# Patient Record
Sex: MALE | Race: OTHER | URBAN - METROPOLITAN AREA
[De-identification: names, ages, dates, MRNs, and addresses within clinical notes are randomized per-mention and may not be internally consistent; named-entity substitution may affect disease eponyms.]

---

## 2020-06-09 ENCOUNTER — OFFICE VISIT (OUTPATIENT)
Dept: FAMILY MEDICINE CLINIC | Facility: CLINIC | Age: 40
End: 2020-06-09
Payer: COMMERCIAL

## 2020-06-09 VITALS
HEART RATE: 77 BPM | RESPIRATION RATE: 18 BRPM | SYSTOLIC BLOOD PRESSURE: 120 MMHG | BODY MASS INDEX: 44.1 KG/M2 | DIASTOLIC BLOOD PRESSURE: 90 MMHG | WEIGHT: 315 LBS | HEIGHT: 71 IN

## 2020-06-09 DIAGNOSIS — E66.01 MORBID OBESITY WITH BMI OF 50.0-59.9, ADULT (HCC): ICD-10-CM

## 2020-06-09 DIAGNOSIS — M25.561 CHRONIC PAIN OF BOTH KNEES: ICD-10-CM

## 2020-06-09 DIAGNOSIS — Z00.00 ENCOUNTER FOR PREVENTIVE CARE: ICD-10-CM

## 2020-06-09 DIAGNOSIS — M25.562 CHRONIC PAIN OF BOTH KNEES: ICD-10-CM

## 2020-06-09 DIAGNOSIS — Z76.89 ESTABLISHING CARE WITH NEW DOCTOR, ENCOUNTER FOR: Primary | ICD-10-CM

## 2020-06-09 DIAGNOSIS — G89.29 CHRONIC PAIN OF BOTH KNEES: ICD-10-CM

## 2020-06-09 PROCEDURE — 99385 PREV VISIT NEW AGE 18-39: CPT | Performed by: FAMILY MEDICINE

## 2020-06-09 NOTE — PROGRESS NOTES
HPI:    Patient ID: Brad Quach is a 46year old male.     This is a 29-year-old [address] [facility] male here to establish care with new physician and also for preventive care and for status update on any confirmed chronic medical illnesses and follow up crepitus bilateral knees. Neurological: He is alert and oriented to person, place, and time. ASSESSMENT/PLAN:   1. Establishing care with new doctor, encounter for  The following have been ordered.   - CBC WITH DIFFERENTIAL WITH PLATELET; Fut

## 2020-06-09 NOTE — PATIENT INSTRUCTIONS
All adult screening ordered and done appropriate for patient's age and gender and risk factors and complaints. Recommend weight loss via daily exercising and consistent healthy dietary changes.   Encouraged physical fitness and daily physical activity oliver

## 2020-07-17 ENCOUNTER — NURSE ONLY (OUTPATIENT)
Dept: FAMILY MEDICINE CLINIC | Facility: CLINIC | Age: 40
End: 2020-07-17
Payer: COMMERCIAL

## 2020-07-17 DIAGNOSIS — Z76.89 ESTABLISHING CARE WITH NEW DOCTOR, ENCOUNTER FOR: ICD-10-CM

## 2020-07-17 LAB
ALBUMIN SERPL-MCNC: 3.8 G/DL (ref 3.4–5)
ALBUMIN/GLOB SERPL: 0.8 {RATIO} (ref 1–2)
ALP LIVER SERPL-CCNC: 74 U/L (ref 45–117)
ALT SERPL-CCNC: 68 U/L (ref 16–61)
ANION GAP SERPL CALC-SCNC: 10 MMOL/L (ref 0–18)
AST SERPL-CCNC: 46 U/L (ref 15–37)
BASOPHILS # BLD AUTO: 0.11 X10(3) UL (ref 0–0.2)
BASOPHILS NFR BLD AUTO: 1.3 %
BILIRUB SERPL-MCNC: 0.4 MG/DL (ref 0.1–2)
BILIRUB UR QL: NEGATIVE
BUN BLD-MCNC: 15 MG/DL (ref 7–18)
BUN/CREAT SERPL: 16.3 (ref 10–20)
CALCIUM BLD-MCNC: 9.7 MG/DL (ref 8.5–10.1)
CHLORIDE SERPL-SCNC: 105 MMOL/L (ref 98–112)
CHOLEST SMN-MCNC: 239 MG/DL (ref ?–200)
CLARITY UR: CLEAR
CO2 SERPL-SCNC: 27 MMOL/L (ref 21–32)
COLOR UR: YELLOW
CREAT BLD-MCNC: 0.92 MG/DL (ref 0.7–1.3)
DEPRECATED RDW RBC AUTO: 40.8 FL (ref 35.1–46.3)
EOSINOPHIL # BLD AUTO: 0.13 X10(3) UL (ref 0–0.7)
EOSINOPHIL NFR BLD AUTO: 1.5 %
ERYTHROCYTE [DISTWIDTH] IN BLOOD BY AUTOMATED COUNT: 13.2 % (ref 11–15)
GLOBULIN PLAS-MCNC: 4.5 G/DL (ref 2.8–4.4)
GLUCOSE BLD-MCNC: 100 MG/DL (ref 70–99)
GLUCOSE UR-MCNC: NEGATIVE MG/DL
HCT VFR BLD AUTO: 47.3 % (ref 39–53)
HDLC SERPL-MCNC: 39 MG/DL (ref 40–59)
HGB BLD-MCNC: 15.4 G/DL (ref 13–17.5)
HGB UR QL STRIP.AUTO: NEGATIVE
IMM GRANULOCYTES # BLD AUTO: 0.02 X10(3) UL (ref 0–1)
IMM GRANULOCYTES NFR BLD: 0.2 %
KETONES UR-MCNC: NEGATIVE MG/DL
LDLC SERPL CALC-MCNC: 178 MG/DL (ref ?–100)
LEUKOCYTE ESTERASE UR QL STRIP.AUTO: NEGATIVE
LYMPHOCYTES # BLD AUTO: 3.18 X10(3) UL (ref 1–4)
LYMPHOCYTES NFR BLD AUTO: 37.1 %
M PROTEIN MFR SERPL ELPH: 8.3 G/DL (ref 6.4–8.2)
MCH RBC QN AUTO: 27.7 PG (ref 26–34)
MCHC RBC AUTO-ENTMCNC: 32.6 G/DL (ref 31–37)
MCV RBC AUTO: 85.2 FL (ref 80–100)
MONOCYTES # BLD AUTO: 0.56 X10(3) UL (ref 0.1–1)
MONOCYTES NFR BLD AUTO: 6.5 %
NEUTROPHILS # BLD AUTO: 4.56 X10 (3) UL (ref 1.5–7.7)
NEUTROPHILS # BLD AUTO: 4.56 X10(3) UL (ref 1.5–7.7)
NEUTROPHILS NFR BLD AUTO: 53.4 %
NITRITE UR QL STRIP.AUTO: NEGATIVE
NONHDLC SERPL-MCNC: 200 MG/DL (ref ?–130)
OSMOLALITY SERPL CALC.SUM OF ELEC: 295 MOSM/KG (ref 275–295)
PATIENT FASTING Y/N/NP: YES
PATIENT FASTING Y/N/NP: YES
PH UR: 5 [PH] (ref 5–8)
PLATELET # BLD AUTO: 289 10(3)UL (ref 150–450)
POTASSIUM SERPL-SCNC: 4.3 MMOL/L (ref 3.5–5.1)
PROT UR-MCNC: NEGATIVE MG/DL
RBC # BLD AUTO: 5.55 X10(6)UL (ref 4.3–5.7)
SODIUM SERPL-SCNC: 142 MMOL/L (ref 136–145)
SP GR UR STRIP: 1.02 (ref 1–1.03)
TRIGL SERPL-MCNC: 111 MG/DL (ref 30–149)
TSI SER-ACNC: 3.17 MIU/ML (ref 0.36–3.74)
UROBILINOGEN UR STRIP-ACNC: <2
VLDLC SERPL CALC-MCNC: 22 MG/DL (ref 0–30)
WBC # BLD AUTO: 8.6 X10(3) UL (ref 4–11)

## 2022-06-30 ENCOUNTER — LAB ENCOUNTER (OUTPATIENT)
Dept: LAB | Age: 42
End: 2022-06-30
Attending: FAMILY MEDICINE
Payer: COMMERCIAL

## 2022-06-30 ENCOUNTER — OFFICE VISIT (OUTPATIENT)
Dept: FAMILY MEDICINE CLINIC | Facility: CLINIC | Age: 42
End: 2022-06-30
Payer: COMMERCIAL

## 2022-06-30 VITALS
DIASTOLIC BLOOD PRESSURE: 90 MMHG | OXYGEN SATURATION: 99 % | SYSTOLIC BLOOD PRESSURE: 130 MMHG | HEART RATE: 78 BPM | BODY MASS INDEX: 44.1 KG/M2 | RESPIRATION RATE: 19 BRPM | HEIGHT: 71 IN | WEIGHT: 315 LBS

## 2022-06-30 DIAGNOSIS — E55.9 VITAMIN D DEFICIENCY: ICD-10-CM

## 2022-06-30 DIAGNOSIS — Z00.00 ROUTINE PHYSICAL EXAMINATION: Primary | ICD-10-CM

## 2022-06-30 DIAGNOSIS — R68.82 DECREASED LIBIDO: ICD-10-CM

## 2022-06-30 DIAGNOSIS — Z00.00 ROUTINE PHYSICAL EXAMINATION: ICD-10-CM

## 2022-06-30 LAB
ALBUMIN SERPL-MCNC: 3.5 G/DL (ref 3.4–5)
ALBUMIN/GLOB SERPL: 0.7 {RATIO} (ref 1–2)
ALP LIVER SERPL-CCNC: 74 U/L
ALT SERPL-CCNC: 73 U/L
ANION GAP SERPL CALC-SCNC: 7 MMOL/L (ref 0–18)
AST SERPL-CCNC: 55 U/L (ref 15–37)
BASOPHILS # BLD AUTO: 0.13 X10(3) UL (ref 0–0.2)
BASOPHILS NFR BLD AUTO: 1.3 %
BILIRUB SERPL-MCNC: 0.6 MG/DL (ref 0.1–2)
BILIRUB UR QL: NEGATIVE
BUN BLD-MCNC: 9 MG/DL (ref 7–18)
BUN/CREAT SERPL: 11.1 (ref 10–20)
CALCIUM BLD-MCNC: 9.3 MG/DL (ref 8.5–10.1)
CHLORIDE SERPL-SCNC: 106 MMOL/L (ref 98–112)
CHOLEST SERPL-MCNC: 223 MG/DL (ref ?–200)
CLARITY UR: CLEAR
CO2 SERPL-SCNC: 29 MMOL/L (ref 21–32)
COLOR UR: YELLOW
COMPLEXED PSA SERPL-MCNC: 0.47 NG/ML (ref ?–4)
CREAT BLD-MCNC: 0.81 MG/DL
DEPRECATED RDW RBC AUTO: 42.5 FL (ref 35.1–46.3)
EOSINOPHIL # BLD AUTO: 0.28 X10(3) UL (ref 0–0.7)
EOSINOPHIL NFR BLD AUTO: 2.8 %
ERYTHROCYTE [DISTWIDTH] IN BLOOD BY AUTOMATED COUNT: 13.4 % (ref 11–15)
FASTING PATIENT LIPID ANSWER: YES
FASTING STATUS PATIENT QL REPORTED: YES
GLOBULIN PLAS-MCNC: 4.7 G/DL (ref 2.8–4.4)
GLUCOSE BLD-MCNC: 99 MG/DL (ref 70–99)
GLUCOSE UR-MCNC: NEGATIVE MG/DL
HCT VFR BLD AUTO: 48.5 %
HDLC SERPL-MCNC: 41 MG/DL (ref 40–59)
HGB BLD-MCNC: 15.5 G/DL
HGB UR QL STRIP.AUTO: NEGATIVE
IMM GRANULOCYTES # BLD AUTO: 0.06 X10(3) UL (ref 0–1)
IMM GRANULOCYTES NFR BLD: 0.6 %
KETONES UR-MCNC: NEGATIVE MG/DL
LDLC SERPL CALC-MCNC: 163 MG/DL (ref ?–100)
LEUKOCYTE ESTERASE UR QL STRIP.AUTO: NEGATIVE
LYMPHOCYTES # BLD AUTO: 3.39 X10(3) UL (ref 1–4)
LYMPHOCYTES NFR BLD AUTO: 33.7 %
MCH RBC QN AUTO: 27.7 PG (ref 26–34)
MCHC RBC AUTO-ENTMCNC: 32 G/DL (ref 31–37)
MCV RBC AUTO: 86.8 FL
MONOCYTES # BLD AUTO: 0.57 X10(3) UL (ref 0.1–1)
MONOCYTES NFR BLD AUTO: 5.7 %
NEUTROPHILS # BLD AUTO: 5.62 X10 (3) UL (ref 1.5–7.7)
NEUTROPHILS # BLD AUTO: 5.62 X10(3) UL (ref 1.5–7.7)
NEUTROPHILS NFR BLD AUTO: 55.9 %
NITRITE UR QL STRIP.AUTO: NEGATIVE
NONHDLC SERPL-MCNC: 182 MG/DL (ref ?–130)
OSMOLALITY SERPL CALC.SUM OF ELEC: 293 MOSM/KG (ref 275–295)
PH UR: 5 [PH] (ref 5–8)
PLATELET # BLD AUTO: 291 10(3)UL (ref 150–450)
POTASSIUM SERPL-SCNC: 4.2 MMOL/L (ref 3.5–5.1)
PROT SERPL-MCNC: 8.2 G/DL (ref 6.4–8.2)
PROT UR-MCNC: NEGATIVE MG/DL
RBC # BLD AUTO: 5.59 X10(6)UL
SODIUM SERPL-SCNC: 142 MMOL/L (ref 136–145)
SP GR UR STRIP: 1.02 (ref 1–1.03)
TRIGL SERPL-MCNC: 105 MG/DL (ref 30–149)
TSI SER-ACNC: 1.79 MIU/ML (ref 0.36–3.74)
UROBILINOGEN UR STRIP-ACNC: 0.2
VIT D+METAB SERPL-MCNC: 11.7 NG/ML (ref 30–100)
VLDLC SERPL CALC-MCNC: 21 MG/DL (ref 0–30)
WBC # BLD AUTO: 10.1 X10(3) UL (ref 4–11)

## 2022-06-30 PROCEDURE — 99212 OFFICE O/P EST SF 10 MIN: CPT | Performed by: FAMILY MEDICINE

## 2022-06-30 PROCEDURE — 3080F DIAST BP >= 90 MM HG: CPT | Performed by: FAMILY MEDICINE

## 2022-06-30 PROCEDURE — 84403 ASSAY OF TOTAL TESTOSTERONE: CPT

## 2022-06-30 PROCEDURE — 99396 PREV VISIT EST AGE 40-64: CPT | Performed by: FAMILY MEDICINE

## 2022-06-30 PROCEDURE — 3075F SYST BP GE 130 - 139MM HG: CPT | Performed by: FAMILY MEDICINE

## 2022-06-30 PROCEDURE — 90471 IMMUNIZATION ADMIN: CPT | Performed by: FAMILY MEDICINE

## 2022-06-30 PROCEDURE — 80061 LIPID PANEL: CPT

## 2022-06-30 PROCEDURE — 84402 ASSAY OF FREE TESTOSTERONE: CPT

## 2022-06-30 PROCEDURE — 90715 TDAP VACCINE 7 YRS/> IM: CPT | Performed by: FAMILY MEDICINE

## 2022-06-30 PROCEDURE — 36415 COLL VENOUS BLD VENIPUNCTURE: CPT

## 2022-06-30 PROCEDURE — 84443 ASSAY THYROID STIM HORMONE: CPT

## 2022-06-30 PROCEDURE — 80053 COMPREHEN METABOLIC PANEL: CPT

## 2022-06-30 PROCEDURE — 3008F BODY MASS INDEX DOCD: CPT | Performed by: FAMILY MEDICINE

## 2022-06-30 PROCEDURE — 82306 VITAMIN D 25 HYDROXY: CPT

## 2022-06-30 PROCEDURE — 85025 COMPLETE CBC W/AUTO DIFF WBC: CPT

## 2022-06-30 PROCEDURE — 81003 URINALYSIS AUTO W/O SCOPE: CPT

## 2022-06-30 NOTE — PROGRESS NOTES
Subjective:     Patient ID: Elsy San Luis is a 37year old male. 37year old obese male here for complete preventive care physical and for status update on any confirmed chronic medical illnesses and follow up on any previous labs or procedures that were suggestive or in need of further work up. Bowel, bladder, and sexual function are intact. All physical or mental efforts followed by unreasonable fatigue over the last 5 months or so. Sexual performance also declining. Tried some commercial ED enhancement which did not work. Fogginess experienced while working and while at work on [employer]. Occasional snoring and no apneic spells noted. History/Other:   Review of Systems  No current outpatient medications on file. Allergies:Not on File    History reviewed. No pertinent past medical history. History reviewed. No pertinent surgical history. History reviewed. No pertinent family history. Social History: Social History    Tobacco Use      Smoking status: Current Some Day Smoker        Types: Cigars, Cigarettes      Smokeless tobacco: Never Used    Vaping Use      Vaping Use: Never used    Alcohol use: Yes    Drug use: Never       Objective:     06/30/22  1542   BP: 130/90   Pulse:    Resp:        Physical Exam  Constitutional:       Appearance: Normal appearance. He is obese. HENT:      Head: Normocephalic and atraumatic. Right Ear: Tympanic membrane normal.      Left Ear: Tympanic membrane normal.      Nose: Nose normal.      Mouth/Throat:      Mouth: Mucous membranes are moist.   Cardiovascular:      Rate and Rhythm: Normal rate and regular rhythm. Heart sounds: Normal heart sounds. No friction rub. Pulmonary:      Effort: Pulmonary effort is normal. No respiratory distress. Breath sounds: Normal breath sounds. Neurological:      General: No focal deficit present. Mental Status: He is alert and oriented to person, place, and time.    Psychiatric:         Mood and Affect: Mood normal.         Behavior: Behavior normal.         Thought Content: Thought content normal.         Judgment: Judgment normal.         Assessment & Plan:   1. Routine physical examination  The following have been ordered. - CBC WITH DIFFERENTIAL WITH PLATELET; Future  - COMP METABOLIC PANEL (14); Future  - LIPID PANEL; Future  - URINALYSIS, ROUTINE; Future  - TSH W REFLEX TO FREE T4; Future  - PSA SCREEN; Future    2. Decreased libido  Libido work-up initiated. - TESTOSTERONE,FREE AND TOTAL; Future    3. Vitamin D deficiency  Vitamin D level assessment.  - VITAMIN D; Future    Orders Placed This Encounter      CBC With Differential With Platelet      Comp Metabolic Panel (14)      Lipid Panel      Urinalysis, Routine      TSH W Reflex To Free T4      PSA Screen      TETANUS, DIPHTHERIA TOXOIDS AND ACELLULAR PERTUSIS VACCINE (TDAP), >7 YEARS, IM USE      Meds This Visit:  Requested Prescriptions      No prescriptions requested or ordered in this encounter       Imaging & Referrals:  TETANUS, DIPHTHERIA TOXOIDS AND ACELLULAR PERTUSIS VACCINE (TDAP), >7 YEARS, IM USE   Patient Instructions   All adult screening ordered and done appropriate for patient's age and gender and risk factors and complaints. In addition to the preventive care labs, we will get testosterone level free and total as well as a vitamin D3 level. If all labs are normal and unremarkable we will give consideration to urology consultation to see if urinary frequency and diminished sexual function are secondary to benign prostate enlargement.

## 2022-06-30 NOTE — PATIENT INSTRUCTIONS
All adult screening ordered and done appropriate for patient's age and gender and risk factors and complaints. In addition to the preventive care labs, we will get testosterone level free and total as well as a vitamin D3 level. If all labs are normal and unremarkable we will give consideration to urology consultation to see if urinary frequency and diminished sexual function are secondary to benign prostate enlargement.

## 2022-07-10 DIAGNOSIS — E55.9 VITAMIN D DEFICIENCY: Primary | ICD-10-CM

## 2022-07-10 RX ORDER — ERGOCALCIFEROL 1.25 MG/1
50000 CAPSULE ORAL WEEKLY
Qty: 12 CAPSULE | Refills: 0 | Status: SHIPPED | OUTPATIENT
Start: 2022-07-10 | End: 2022-10-02

## 2022-08-02 LAB
TESTOSTERONE, FREE, S: 4.27 NG/DL
TESTOSTERONE, TOTAL, S: 201 NG/DL

## 2022-08-04 DIAGNOSIS — E55.9 VITAMIN D DEFICIENCY: Primary | ICD-10-CM

## 2022-08-04 RX ORDER — ERGOCALCIFEROL 1.25 MG/1
50000 CAPSULE ORAL WEEKLY
Qty: 12 CAPSULE | Refills: 0 | Status: SHIPPED | OUTPATIENT
Start: 2022-08-04 | End: 2022-10-27

## 2023-12-19 ENCOUNTER — OFFICE VISIT (OUTPATIENT)
Dept: FAMILY MEDICINE CLINIC | Facility: CLINIC | Age: 43
End: 2023-12-19
Payer: COMMERCIAL

## 2023-12-19 VITALS
HEIGHT: 71 IN | BODY MASS INDEX: 44.1 KG/M2 | OXYGEN SATURATION: 97 % | DIASTOLIC BLOOD PRESSURE: 86 MMHG | TEMPERATURE: 98 F | WEIGHT: 315 LBS | SYSTOLIC BLOOD PRESSURE: 132 MMHG | HEART RATE: 86 BPM

## 2023-12-19 DIAGNOSIS — E66.01 MORBID OBESITY WITH BMI OF 60.0-69.9, ADULT (HCC): ICD-10-CM

## 2023-12-19 DIAGNOSIS — R06.83 SNORING: ICD-10-CM

## 2023-12-19 DIAGNOSIS — R53.83 MALAISE AND FATIGUE: ICD-10-CM

## 2023-12-19 DIAGNOSIS — M25.521 CHRONIC PAIN OF RIGHT ELBOW: Primary | ICD-10-CM

## 2023-12-19 DIAGNOSIS — G89.29 CHRONIC PAIN OF RIGHT ELBOW: Primary | ICD-10-CM

## 2023-12-19 DIAGNOSIS — R29.818 SUSPECTED SLEEP APNEA: ICD-10-CM

## 2023-12-19 DIAGNOSIS — Z00.00 ROUTINE PHYSICAL EXAMINATION: ICD-10-CM

## 2023-12-19 DIAGNOSIS — N52.9 ERECTILE DYSFUNCTION, UNSPECIFIED ERECTILE DYSFUNCTION TYPE: ICD-10-CM

## 2023-12-19 DIAGNOSIS — R53.81 MALAISE AND FATIGUE: ICD-10-CM

## 2023-12-19 PROCEDURE — 99213 OFFICE O/P EST LOW 20 MIN: CPT | Performed by: FAMILY MEDICINE

## 2023-12-19 PROCEDURE — 3079F DIAST BP 80-89 MM HG: CPT | Performed by: FAMILY MEDICINE

## 2023-12-19 PROCEDURE — 99396 PREV VISIT EST AGE 40-64: CPT | Performed by: FAMILY MEDICINE

## 2023-12-19 PROCEDURE — 3008F BODY MASS INDEX DOCD: CPT | Performed by: FAMILY MEDICINE

## 2023-12-19 PROCEDURE — 3075F SYST BP GE 130 - 139MM HG: CPT | Performed by: FAMILY MEDICINE

## 2023-12-19 NOTE — PROGRESS NOTES
Subjective:     Patient ID: Samira Ruiz is a 44year old male. This patient has a 50-year-old morbidly obese gentleman here for complete preventive care physical and for status update on any confirmed chronic medical illnesses and follow up on any previous labs or procedures that were suggestive or in need of further work up. Bowel and bladder functions are intact. The patient's greater concern is that he is not reasonably fatigued and can fall asleep easily sitting in the chair upright without any stimulation. Patient does snore. Patient seeking bariatric referral for weight reduction. Patient states that his lack of energy is worsening. There was no increased energy and spite of testosterone replacement. Patient did not improve sexual function on ED medications or HYMS product. Patient also reports pain over the last 2 months without any direct trauma or provocation on the right lateral right elbow. The patient does golf. History/Other:   Review of Systems  Current Outpatient Medications   Medication Sig Dispense Refill    Testosterone (ANDROGEL) 40.5 MG/2.5GM (1.62%) Transdermal Gel Place 1 Application onto the skin daily. (Patient not taking: Reported on 12/19/2023) 90 each 0     Allergies:No Known Allergies    No past medical history on file. No past surgical history on file. No family history on file. Social History:   Social History     Socioeconomic History    Marital status:    Tobacco Use    Smoking status: Some Days     Types: Cigars, Cigarettes    Smokeless tobacco: Never   Vaping Use    Vaping Use: Never used   Substance and Sexual Activity    Alcohol use: Yes    Drug use: Never    Sexual activity: Not Currently        Objective:   Vitals:    12/19/23 1703   BP: 132/86   Pulse:    Temp:        Physical Exam  Constitutional:       Appearance: He is obese. HENT:      Head: Normocephalic and atraumatic.       Right Ear: Tympanic membrane normal.      Left Ear: Tympanic membrane normal.      Nose: Congestion present. Mouth/Throat:      Mouth: Mucous membranes are moist.      Comments: Thickened soft palate hanging low and obstructive posterior pharyngeal anatomy. Cardiovascular:      Rate and Rhythm: Normal rate and regular rhythm. Pulmonary:      Effort: Pulmonary effort is normal.      Breath sounds: Normal breath sounds. Musculoskeletal:      Right elbow: Tenderness present. Arms:       Comments: Region of discomfort as depicted-right elbow. Neurological:      Mental Status: He is alert and oriented to person, place, and time. Psychiatric:         Mood and Affect: Mood normal.         Assessment & Plan:   1. Routine physical examination  The following labs have been ordered. - CBC With Differential With Platelet; Future  - Comp Metabolic Panel (14); Future  - Lipid Panel; Future  - TSH W Reflex To Free T4; Future  - Urinalysis, Routine; Future  - PSA Total, Screen; Future    2. Chronic pain of right elbow  Referral generated. - Ortho Referral - In Network    3. Morbid obesity with BMI of 60.0-69.9, adult [facility])  Referral generated. - [facility] - Dr. Sunita Frey for [facility] 9  - General sleep study; Future    4. Erectile dysfunction, unspecified erectile dysfunction type  Status update.  - Testosterone,Total and Weakly Bound w/ SHBG; Future    5. Malaise and fatigue  Apnea suspect. The following test has been ordered. - Home Sleep Apnea Test (Adult pt only) - Sleep consult required for Medicare pts  - General sleep study; Future    6. Suspected sleep apnea  See #5.  - Home Sleep Apnea Test (Adult pt only) - Sleep consult required for Medicare pts  - General sleep study; Future    7. Snoring  See #5.  - Home Sleep Apnea Test (Adult pt only) - Sleep consult required for Medicare pts      No orders of the defined types were placed in this encounter.       Meds This Visit:  Requested Prescriptions      No prescriptions requested or ordered in this encounter       Imaging & Referrals:  None     Patient Instructions   All adult screening ordered and done appropriate for patient's age and gender and risk factors and complaints. Recommend weight loss via daily exercising and consistent healthy dietary changes. Encouraged physical fitness and daily physical activity daily. Referred to Bariatrics. Sleep study recommended. Return if symptoms worsen or fail to improve.

## 2024-01-03 ENCOUNTER — LAB ENCOUNTER (OUTPATIENT)
Dept: LAB | Age: 44
End: 2024-01-03
Attending: FAMILY MEDICINE
Payer: COMMERCIAL

## 2024-01-03 DIAGNOSIS — N52.9 ERECTILE DYSFUNCTION, UNSPECIFIED ERECTILE DYSFUNCTION TYPE: ICD-10-CM

## 2024-01-03 DIAGNOSIS — Z00.00 ROUTINE PHYSICAL EXAMINATION: ICD-10-CM

## 2024-01-03 LAB
ALBUMIN SERPL-MCNC: 4.3 G/DL (ref 3.2–4.8)
ALBUMIN/GLOB SERPL: 1.3 {RATIO} (ref 1–2)
ALP LIVER SERPL-CCNC: 80 U/L
ALT SERPL-CCNC: 52 U/L
ANION GAP SERPL CALC-SCNC: 4 MMOL/L (ref 0–18)
AST SERPL-CCNC: 41 U/L (ref ?–34)
BASOPHILS # BLD AUTO: 0.14 X10(3) UL (ref 0–0.2)
BASOPHILS NFR BLD AUTO: 1.2 %
BILIRUB SERPL-MCNC: 0.4 MG/DL (ref 0.3–1.2)
BILIRUB UR QL: NEGATIVE
BUN BLD-MCNC: 12 MG/DL (ref 9–23)
BUN/CREAT SERPL: 16.7 (ref 10–20)
CALCIUM BLD-MCNC: 9.4 MG/DL (ref 8.7–10.4)
CHLORIDE SERPL-SCNC: 107 MMOL/L (ref 98–112)
CHOLEST SERPL-MCNC: 219 MG/DL (ref ?–200)
CLARITY UR: CLEAR
CO2 SERPL-SCNC: 27 MMOL/L (ref 21–32)
COMPLEXED PSA SERPL-MCNC: 0.2 NG/ML (ref ?–4)
CREAT BLD-MCNC: 0.72 MG/DL
DEPRECATED RDW RBC AUTO: 40.4 FL (ref 35.1–46.3)
EGFRCR SERPLBLD CKD-EPI 2021: 117 ML/MIN/1.73M2 (ref 60–?)
EOSINOPHIL # BLD AUTO: 0.4 X10(3) UL (ref 0–0.7)
EOSINOPHIL NFR BLD AUTO: 3.4 %
ERYTHROCYTE [DISTWIDTH] IN BLOOD BY AUTOMATED COUNT: 13.1 % (ref 11–15)
FASTING PATIENT LIPID ANSWER: YES
FASTING STATUS PATIENT QL REPORTED: YES
GLOBULIN PLAS-MCNC: 3.4 G/DL (ref 2.8–4.4)
GLUCOSE BLD-MCNC: 139 MG/DL (ref 70–99)
GLUCOSE UR-MCNC: NORMAL MG/DL
HCT VFR BLD AUTO: 47.3 %
HDLC SERPL-MCNC: 43 MG/DL (ref 40–59)
HGB BLD-MCNC: 15.5 G/DL
HGB UR QL STRIP.AUTO: NEGATIVE
IMM GRANULOCYTES # BLD AUTO: 0.05 X10(3) UL (ref 0–1)
IMM GRANULOCYTES NFR BLD: 0.4 %
KETONES UR-MCNC: NEGATIVE MG/DL
LDLC SERPL CALC-MCNC: 146 MG/DL (ref ?–100)
LEUKOCYTE ESTERASE UR QL STRIP.AUTO: NEGATIVE
LYMPHOCYTES # BLD AUTO: 4.19 X10(3) UL (ref 1–4)
LYMPHOCYTES NFR BLD AUTO: 35.6 %
MCH RBC QN AUTO: 27.6 PG (ref 26–34)
MCHC RBC AUTO-ENTMCNC: 32.8 G/DL (ref 31–37)
MCV RBC AUTO: 84.3 FL
MONOCYTES # BLD AUTO: 0.9 X10(3) UL (ref 0.1–1)
MONOCYTES NFR BLD AUTO: 7.6 %
NEUTROPHILS # BLD AUTO: 6.1 X10 (3) UL (ref 1.5–7.7)
NEUTROPHILS # BLD AUTO: 6.1 X10(3) UL (ref 1.5–7.7)
NEUTROPHILS NFR BLD AUTO: 51.8 %
NITRITE UR QL STRIP.AUTO: NEGATIVE
NONHDLC SERPL-MCNC: 176 MG/DL (ref ?–130)
OSMOLALITY SERPL CALC.SUM OF ELEC: 288 MOSM/KG (ref 275–295)
PH UR: 5 [PH] (ref 5–8)
PLATELET # BLD AUTO: 271 10(3)UL (ref 150–450)
POTASSIUM SERPL-SCNC: 4 MMOL/L (ref 3.5–5.1)
PROT SERPL-MCNC: 7.7 G/DL (ref 5.7–8.2)
PROT UR-MCNC: NEGATIVE MG/DL
RBC # BLD AUTO: 5.61 X10(6)UL
SODIUM SERPL-SCNC: 138 MMOL/L (ref 136–145)
SP GR UR STRIP: 1.02 (ref 1–1.03)
T4 FREE SERPL-MCNC: 1.1 NG/DL (ref 0.8–1.7)
TRIGL SERPL-MCNC: 163 MG/DL (ref 30–149)
TSI SER-ACNC: 5.86 MIU/ML (ref 0.55–4.78)
UROBILINOGEN UR STRIP-ACNC: NORMAL
VLDLC SERPL CALC-MCNC: 31 MG/DL (ref 0–30)
WBC # BLD AUTO: 11.8 X10(3) UL (ref 4–11)

## 2024-01-03 PROCEDURE — 84443 ASSAY THYROID STIM HORMONE: CPT

## 2024-01-03 PROCEDURE — 36415 COLL VENOUS BLD VENIPUNCTURE: CPT

## 2024-01-03 PROCEDURE — 80053 COMPREHEN METABOLIC PANEL: CPT

## 2024-01-03 PROCEDURE — 80061 LIPID PANEL: CPT

## 2024-01-03 PROCEDURE — 84439 ASSAY OF FREE THYROXINE: CPT

## 2024-01-03 PROCEDURE — 84410 TESTOSTERONE BIOAVAILABLE: CPT

## 2024-01-03 PROCEDURE — 81003 URINALYSIS AUTO W/O SCOPE: CPT

## 2024-01-03 PROCEDURE — 85025 COMPLETE CBC W/AUTO DIFF WBC: CPT

## 2024-01-05 LAB
SEX HORM BIND GLOB: 34.4 NMOL/L
TESTOST % FREE+WEAK BND: 16.8 %
TESTOST FREE+WEAK BND: 34 NG/DL
TESTOSTERONE TOT /MS: 202.1 NG/DL

## 2024-01-06 DIAGNOSIS — D72.829 LEUKOCYTOSIS, UNSPECIFIED TYPE: ICD-10-CM

## 2024-01-06 DIAGNOSIS — R73.9 ELEVATED SERUM GLUCOSE: Primary | ICD-10-CM

## 2024-01-06 DIAGNOSIS — R79.89 ELEVATED LFTS: ICD-10-CM

## 2024-01-06 DIAGNOSIS — R79.89 ELEVATED TSH: ICD-10-CM

## 2024-01-09 ENCOUNTER — PATIENT MESSAGE (OUTPATIENT)
Dept: FAMILY MEDICINE CLINIC | Facility: CLINIC | Age: 44
End: 2024-01-09

## 2024-01-09 DIAGNOSIS — E34.9 HYPOTESTOSTERONISM: Primary | ICD-10-CM

## 2024-01-10 NOTE — TELEPHONE ENCOUNTER
Your labs have been reviewed and your testosterone level is below the desired range.  You would likely benefit from an increase in your dosage.  Additionally you have elevated blood sugar and if this was a fasting test it is suggestive of new onset diabetes.  I will send an additional test on your current sample to help define whether there is diabetes or not.  You also show a steady slightly elevated liver enzyme level and I have placed on the chart in order for liver ultrasound and you also display a borderline low functioning thyroid.  We will recheck this level in 3 months and if it is still trending to a low function, we will investigate further.  All orders are on the chart already.   Written by Zion Hanson, DO on 1/6/2024  1:38 PM CST  Seen by patient Ricardo Wilkins on 1/9/2024  4:42 AM    Blood work was not able to be added on. Patient will need to return to lab.     Dr. Hanson, please advise on Testosterone dose.     From: Ricardo Wilkins  To: Zion Hanson  Sent: 1/9/2024 12:45 PM CST  Subject: Visit follow up    Can I schedule the test you require at your office or somewhere else, also will you be sending an updated prescription for testosterone? Please advise,   also I scheduled a fallow up in early April as I am concerned about the Thyroid.

## 2024-01-12 RX ORDER — TESTOSTERONE 40.5 MG/2.5G
1 GEL TOPICAL DAILY
Qty: 90 EACH | Refills: 0 | Status: SHIPPED | OUTPATIENT
Start: 2024-01-12

## 2024-01-12 NOTE — TELEPHONE ENCOUNTER
Please let the patient know that testosterone gel has been sent to the pharmacy.  The liver ultrasound can be done wherever it is convenient for him.  We will be following up with his thyroid function test in April.  Thank you.

## 2024-01-29 ENCOUNTER — TELEPHONE (OUTPATIENT)
Dept: FAMILY MEDICINE CLINIC | Facility: CLINIC | Age: 44
End: 2024-01-29

## 2024-01-29 NOTE — TELEPHONE ENCOUNTER
View all authorizations for this medication   Closed   1/29/2024 11:39 AM  Close reason: Prior Authorization not required for patient/medication

## 2024-01-29 NOTE — TELEPHONE ENCOUNTER
Testosterone (ANDROGEL) 40.5 MG/2.5GM (1.62%) Transdermal Gel, Place 1 Application onto the skin daily., Disp: 90 each, Rfl: 0      KEY: [id number]  PATIENT LAST NAME: VINNY  : 1980

## 2024-02-02 NOTE — LETTER
AUTHORIZATION FOR SURGICAL OPERATION OR OTHER PROCEDURE    1. I hereby authorize Dr. Bishop/ Tri Maravilla PA-C, and [facility] staff assigned to my case to perform the following operation and/or procedure at the [facility] site:    _______________________________________________________________________________________________    Cortisone Injection to Right Knee  _______________________________________________________________________________________________    2.  My physician has explained the nature and purpose of the operation or other procedure, possible alternative methods of treatment, the risks involved, and the possibility of complication to me.  I acknowledge that no guarantee has been made as to the result that may be obtained.  3.  I recognize that, during the course of this operation, or other procedure, unforseen conditions may necessitate additional or different procedure than those listed above.  I, therefore, further authorize and request that the above named physician, his/her physician assistants or designees perform such procedures as are, in his/her professional opinion, necessary and desirable.  4.  Any tissue or organs removed in the operation or other procedure may be disposed of by and at the discretion of the [facility] and [facility].  5.  I understand that in the event of a medical emergency, I will be transported by local paramedics to [facility] or other hospital emergency department.  6.  I certify that I have read and fully understand the above consent to operation and/or other procedure.    7.  I acknowledge that my physician has explained sedation/analgesia administration to me including the risks and benefits.  I consent to the administration of sedation/analgesia as may be necessary or desirable in the judgement of my physician.    Witness signature: ___________________________________________________ Date:   ______/______/_____                    Time:  ________ A.M.  P.M.       Patient Name:  ______________________________________________________  (please print)      Patient signature:  ___________________________________________________             Relationship to Patient:           []  Parent    Responsible person                          []  Spouse  In case of minor or                    [] Other  _____________   Incompetent name:  __________________________________________________                               (please print)      _____________      Responsible person  In case of minor or  Incompetent signature:  _______________________________________________    Statement of Physician  My signature below affirms that prior to the time of the procedure, I have explained to the patient and/or his/her guardian, the risks and benefits involved in the proposed treatment and any reasonable alternative to the proposed treatment.  I have also explained the risks and benefits involved in the refusal of the proposed treatment and have answered the patient's questions.                        Date:  ______/______/_______  Provider                      Signature:  __________________________________________________________       Time:  ___________ A.M    P.M.      18

## 2024-02-06 ENCOUNTER — HOSPITAL ENCOUNTER (OUTPATIENT)
Dept: ULTRASOUND IMAGING | Age: 44
Discharge: HOME | End: 2024-02-06
Attending: FAMILY MEDICINE
Payer: COMMERCIAL

## 2024-02-06 ENCOUNTER — LAB ENCOUNTER (OUTPATIENT)
Dept: LAB | Age: 44
End: 2024-02-06
Attending: FAMILY MEDICINE
Payer: COMMERCIAL

## 2024-02-06 DIAGNOSIS — R79.89 ELEVATED TSH: ICD-10-CM

## 2024-02-06 DIAGNOSIS — R79.89 ELEVATED LFTS: ICD-10-CM

## 2024-02-06 DIAGNOSIS — R73.9 ELEVATED SERUM GLUCOSE: ICD-10-CM

## 2024-02-06 DIAGNOSIS — D72.829 LEUKOCYTOSIS, UNSPECIFIED TYPE: ICD-10-CM

## 2024-02-06 DIAGNOSIS — E34.9 HYPOTESTOSTERONISM: ICD-10-CM

## 2024-02-06 LAB
BASOPHILS # BLD AUTO: 0.1 X10(3) UL (ref 0–0.2)
BASOPHILS NFR BLD AUTO: 1 %
DEPRECATED RDW RBC AUTO: 39.2 FL (ref 35.1–46.3)
EOSINOPHIL # BLD AUTO: 0.29 X10(3) UL (ref 0–0.7)
EOSINOPHIL NFR BLD AUTO: 2.9 %
ERYTHROCYTE [DISTWIDTH] IN BLOOD BY AUTOMATED COUNT: 13 % (ref 11–15)
EST. AVERAGE GLUCOSE BLD GHB EST-MCNC: 169 MG/DL (ref 68–126)
HBA1C MFR BLD: 7.5 % (ref ?–5.7)
HCT VFR BLD AUTO: 47.1 %
HGB BLD-MCNC: 15.5 G/DL
IMM GRANULOCYTES # BLD AUTO: 0.05 X10(3) UL (ref 0–1)
IMM GRANULOCYTES NFR BLD: 0.5 %
LYMPHOCYTES # BLD AUTO: 3.3 X10(3) UL (ref 1–4)
LYMPHOCYTES NFR BLD AUTO: 33.5 %
MCH RBC QN AUTO: 27.3 PG (ref 26–34)
MCHC RBC AUTO-ENTMCNC: 32.9 G/DL (ref 31–37)
MCV RBC AUTO: 82.9 FL
MONOCYTES # BLD AUTO: 0.71 X10(3) UL (ref 0.1–1)
MONOCYTES NFR BLD AUTO: 7.2 %
NEUTROPHILS # BLD AUTO: 5.4 X10 (3) UL (ref 1.5–7.7)
NEUTROPHILS # BLD AUTO: 5.4 X10(3) UL (ref 1.5–7.7)
NEUTROPHILS NFR BLD AUTO: 54.9 %
PLATELET # BLD AUTO: 286 10(3)UL (ref 150–450)
RBC # BLD AUTO: 5.68 X10(6)UL
TSI SER-ACNC: 2.86 MIU/ML (ref 0.55–4.78)
WBC # BLD AUTO: 9.9 X10(3) UL (ref 4–11)

## 2024-02-06 PROCEDURE — 84443 ASSAY THYROID STIM HORMONE: CPT

## 2024-02-06 PROCEDURE — 36415 COLL VENOUS BLD VENIPUNCTURE: CPT

## 2024-02-06 PROCEDURE — 3051F HG A1C>EQUAL 7.0%<8.0%: CPT | Performed by: INTERNAL MEDICINE

## 2024-02-06 PROCEDURE — 76705 ECHO EXAM OF ABDOMEN: CPT | Performed by: FAMILY MEDICINE

## 2024-02-06 PROCEDURE — 83036 HEMOGLOBIN GLYCOSYLATED A1C: CPT

## 2024-02-06 PROCEDURE — 85025 COMPLETE CBC W/AUTO DIFF WBC: CPT

## 2024-02-06 NOTE — TELEPHONE ENCOUNTER
Patient would like to have his medication filled using the mail order pharmacy, instead.    Testosterone (ANDROGEL) 40.5 MG/2.5GM (1.62%) Transdermal Gel 90 each 0 1/12/2024 --   Sig:   Place 1 Application onto the skin daily.         [facility] - [facility] [place] - [address] [facility] AT Portal to Registered [facility] Sites, [phone], [phone]

## 2024-02-07 DIAGNOSIS — E66.01 MORBID OBESITY WITH BMI OF 60.0-69.9, ADULT (HCC): Primary | ICD-10-CM

## 2024-02-07 DIAGNOSIS — E11.9 NEWLY DIAGNOSED DIABETES (HCC): ICD-10-CM

## 2024-02-07 RX ORDER — TESTOSTERONE 40.5 MG/2.5G
1 GEL TOPICAL DAILY
Qty: 90 EACH | Refills: 0 | Status: SHIPPED | OUTPATIENT
Start: 2024-02-07

## 2024-02-08 ENCOUNTER — PATIENT MESSAGE (OUTPATIENT)
Dept: FAMILY MEDICINE CLINIC | Facility: CLINIC | Age: 44
End: 2024-02-08

## 2024-02-08 NOTE — TELEPHONE ENCOUNTER
From: Ricardo Wilkins  To: Zion Hanson  Sent: 2/8/2024 1:48 AM CST  Subject: Referral     Dr. Hanson, Per your referral I have scheduled an appointment with an Endocrinologist But the recommended Dr. Alexandra Rosado was not available until July so I scheduled it with. Randy Gustafson MD let me know if this ok? I also scheduled an appointment with Bariatrics Dr. Aldo Irwin back in December when you referred him but his earliest availability was June 2024, Do you have another recommendation/referral? Last is there any Medication needed for the gallstones and the elevated liver enzymes?

## 2024-02-19 DIAGNOSIS — E34.9 HYPOTESTOSTERONISM: ICD-10-CM

## 2024-02-19 RX ORDER — TESTOSTERONE 40.5 MG/2.5G
1 GEL TOPICAL DAILY
Qty: 90 EACH | Refills: 0 | OUTPATIENT
Start: 2024-02-19

## 2024-02-19 NOTE — TELEPHONE ENCOUNTER
Medication was sent 2/7/24 to mail order  It does not require prior auth  Spoke to [facility] who states they are not locating script      The pharmacy needs a new order electronically sent- they will not take control over the phone   Please resend.

## 2024-02-19 NOTE — TELEPHONE ENCOUNTER
Pt needs a Prior Authorization for Rx:  Testosterone (ANDROGEL) 40.5 MG/2.5GM (1.62%     Send to [facility].    They gave pt phone# for us to call:  [phone]    Send pt a [employer] once we get the PA

## 2024-02-19 NOTE — TELEPHONE ENCOUNTER
MEDICATION PRIOR AUTH REQUIREMENT *  AndroGel 40.5 MG/2.5GM(1.62%) gel Not Required  Androderm    Not Required  Fortesta    Not Required  Natesto     Not Required  Testim     Not Required  Testosterone Cypionate   Not Required  Vogelxo Pump    Not Required  Vogelxo Tube    Not Required

## 2024-02-20 ENCOUNTER — TELEPHONE (OUTPATIENT)
Dept: FAMILY MEDICINE CLINIC | Facility: CLINIC | Age: 44
End: 2024-02-20

## 2024-02-20 ENCOUNTER — OFFICE VISIT (OUTPATIENT)
Dept: SLEEP CENTER | Age: 44
End: 2024-02-20
Attending: FAMILY MEDICINE
Payer: COMMERCIAL

## 2024-02-20 DIAGNOSIS — R29.818 SUSPECTED SLEEP APNEA: ICD-10-CM

## 2024-02-20 DIAGNOSIS — R53.81 MALAISE AND FATIGUE: ICD-10-CM

## 2024-02-20 DIAGNOSIS — R53.83 MALAISE AND FATIGUE: ICD-10-CM

## 2024-02-20 DIAGNOSIS — E66.01 MORBID OBESITY WITH BMI OF 60.0-69.9, ADULT (HCC): ICD-10-CM

## 2024-02-20 PROCEDURE — 95806 SLEEP STUDY UNATT&RESP EFFT: CPT

## 2024-02-20 NOTE — TELEPHONE ENCOUNTER
RN refused the initial  refill request BUT re pended again.    See Radha's note below=will need a new prescription.     RE pended a new prescription and sent to PCP for approval.       You do not have security to approve the following orders   Testosterone (ANDROGEL) 40.5 MG/2.5GM (1.62%) Transdermal Gel

## 2024-02-20 NOTE — TELEPHONE ENCOUNTER
Testosterone (ANDROGEL) 40.5 MG/2.5GM (1.62%) Transdermal Gel, Place 1 Application onto the skin daily., Disp: 90 each, Rfl: 0    Key: [id number]  Patients Last Name: Claudette  : 1980

## 2024-02-20 NOTE — TELEPHONE ENCOUNTER
DUPLICATE REQUEST=  Testosterone (ANDROGEL) 40.5 MG/2.5GM (1.62%) Transdermal Gel 90 each 0 2/7/2024 --    Sig - Route: Place 1 Application onto the skin daily. - Transdermal    Sent to pharmacy as: Testosterone 40.5 MG/2.5GM (1.62%) Transdermal Gel    E-Prescribing Status: Receipt confirmed by pharmacy (2/7/2024 11:38 AM CST)    Prior authorization: Closed - Prior Authorization not required for patient/medication      Associated Diagnoses    Hypotestosteronism        Pharmacy    [facility] - [place] [place] - [address] [facility] AT PORTAL TO REGISTERED [facility] SITES, [phone], [phone]

## 2024-02-22 ENCOUNTER — TELEPHONE (OUTPATIENT)
Dept: FAMILY MEDICINE CLINIC | Facility: CLINIC | Age: 44
End: 2024-02-22

## 2024-02-22 NOTE — PROCEDURES
[facility]  Accredited by the American Academy of Sleep Medicine (AASM)    PATIENT'S NAME: RONNY CASILLAS   ATTENDING PHYSICIAN: Zion Hanson DO   REFERRING PHYSICIAN: Zion Hanson DO   PATIENT ACCOUNT #: [id number] LOCATION: Sleep Center   MEDICAL RECORD #: [id number] YOB: 1980   DATE OF STUDY: 02/20/2024       SLEEP STUDY REPORT    STUDY TYPE:  Home sleep test.    INDICATION:  Suspected obstructive sleep apnea (ICD-10 code G47.33) in a patient with witnessed apneic events, snoring, excessive daytime somnolence, urges to move the limbs at night, 40- to 50-pound weight gain, an [place] Sleepiness Scale score of 13/24, and a body mass index 61.6.    RESULTS:  The patient underwent home sleep test with measurement of his nasal air flow, nasal air pressure, snoring, chest and abdominal wall motion, oximetry, and body position.  I have reviewed the entirety of the raw data of this study.  During this study, total recording time is 487 minutes.  The lights-out clock time is 10:25 p.m., lights-on clock time is 6:32 a.m.  The apnea plus hypopnea index is 124 events per hour, rising to 139 events per hour in the supine position.  The average oxygen saturation is 90%, the lowest oxygen saturation is 66%, and the patient spent 29% of the test with saturations 88% or less.  The average heart rate is 74 beats per minute, and the patient spent approximately 20% of the test in the supine position.    INTERPRETATION:  The data generated from this study is consistent with profound obstructive sleep apnea (ICD-10 code G47.33).    RECOMMENDATIONS:    1.    CPAP titration.    2.   Weight loss.    3.   Avoid alcohol.    4.   Avoid sedating drug.    5.   Patient should not drive if at all sleepy.      Please do not hesitate to contact me if there is any question whatsoever regarding interpretation of this study.    Dictated By Hao Stanton MD  d: 02/21/2024 22:08:55  t: 02/21/2024  22:12:57  [facility] [id number]  [facility]/    cc: Zion Hanson DO

## 2024-02-22 NOTE — TELEPHONE ENCOUNTER
Testosterone (ANDROGEL) 40.5 MG/2.5GM (1.62%) Transdermal Gel, Place 1 Application onto the skin daily., Disp: 90 each, Rfl: 0    Key: [id number]  Patients Last Name: Claudette  : 1980

## 2024-02-25 DIAGNOSIS — G47.33 OSA (OBSTRUCTIVE SLEEP APNEA): Primary | ICD-10-CM

## 2024-02-26 ENCOUNTER — TELEPHONE (OUTPATIENT)
Dept: FAMILY MEDICINE CLINIC | Facility: CLINIC | Age: 44
End: 2024-02-26

## 2024-02-26 DIAGNOSIS — E34.9 HYPOTESTOSTERONISM: ICD-10-CM

## 2024-02-26 RX ORDER — TESTOSTERONE 40.5 MG/2.5G
1 GEL TOPICAL DAILY
Qty: 90 EACH | Refills: 0 | Status: SHIPPED | OUTPATIENT
Start: 2024-02-26

## 2024-02-26 NOTE — TELEPHONE ENCOUNTER
257.2 is the ICD 9 code for low testosterone level.    Prescription sent to the pharmacy again with the ICD 9 code attached.  Thank you.

## 2024-02-26 NOTE — TELEPHONE ENCOUNTER
Testosterone (ANDROGEL) 40.5 MG/2.5GM (1.62%) Transdermal Gel, Place 1 Application onto the skin daily., Disp: 90 each, Rfl: 0    Key: [id number]  Patients Last Name: Claudette  : 1980

## 2024-02-26 NOTE — TELEPHONE ENCOUNTER
I do not know of any other bariatric specialist unless there are other physicians working with Dr. Marmolejo.  He can see anyone in that office if there are other options.  There is no medication for a fatty liver and gallstones.  Thankfully, his gallstones are not in a place where they are causing blockage, thus inflaming his gallbladder.  Weight loss could be the key to the reversal of the fatty liver.  A lot of others have gallstones and for some of was nothing ever needs to be done unless they become symptomatic.  The other endocrinologists that he opted to see is fine.  Thank you.  Please let the patient know.

## 2024-02-29 ENCOUNTER — TELEPHONE (OUTPATIENT)
Dept: FAMILY MEDICINE CLINIC | Facility: CLINIC | Age: 44
End: 2024-02-29

## 2024-03-04 ENCOUNTER — OFFICE VISIT (OUTPATIENT)
Dept: ORTHOPEDICS CLINIC | Facility: CLINIC | Age: 44
End: 2024-03-04

## 2024-03-04 ENCOUNTER — TELEPHONE (OUTPATIENT)
Dept: FAMILY MEDICINE CLINIC | Facility: CLINIC | Age: 44
End: 2024-03-04

## 2024-03-04 ENCOUNTER — HOSPITAL ENCOUNTER (OUTPATIENT)
Dept: GENERAL RADIOLOGY | Facility: HOSPITAL | Age: 44
Discharge: HOME | End: 2024-03-04
Attending: ORTHOPAEDIC SURGERY
Payer: COMMERCIAL

## 2024-03-04 VITALS
DIASTOLIC BLOOD PRESSURE: 89 MMHG | SYSTOLIC BLOOD PRESSURE: 137 MMHG | HEIGHT: 71 IN | BODY MASS INDEX: 44.1 KG/M2 | WEIGHT: 315 LBS | HEART RATE: 81 BPM

## 2024-03-04 DIAGNOSIS — M25.569 KNEE PAIN, UNSPECIFIED CHRONICITY, UNSPECIFIED LATERALITY: ICD-10-CM

## 2024-03-04 DIAGNOSIS — M17.12 PRIMARY OSTEOARTHRITIS OF LEFT KNEE: ICD-10-CM

## 2024-03-04 DIAGNOSIS — E66.01 CLASS 3 SEVERE OBESITY DUE TO EXCESS CALORIES WITH SERIOUS COMORBIDITY AND BODY MASS INDEX (BMI) OF 60.0 TO 69.9 IN ADULT (HCC): ICD-10-CM

## 2024-03-04 DIAGNOSIS — M17.11 PRIMARY OSTEOARTHRITIS OF RIGHT KNEE: Primary | ICD-10-CM

## 2024-03-04 PROCEDURE — 73562 X-RAY EXAM OF KNEE 3: CPT | Performed by: ORTHOPAEDIC SURGERY

## 2024-03-04 RX ORDER — TRIAMCINOLONE ACETONIDE 40 MG/ML
40 INJECTION, SUSPENSION INTRA-ARTICULAR; INTRAMUSCULAR ONCE
Status: COMPLETED | OUTPATIENT
Start: 2024-03-04 | End: 2024-03-04

## 2024-03-04 RX ADMIN — TRIAMCINOLONE ACETONIDE 40 MG: 40 INJECTION, SUSPENSION INTRA-ARTICULAR; INTRAMUSCULAR at 18:14:00

## 2024-03-04 NOTE — TELEPHONE ENCOUNTER
Testosterone (ANDROGEL) 40.5 MG/2.5GM (1.62%) Transdermal Gel, Place 1 Application  onto the skin daily. ICD-9: 257.2, Disp: 90 each, Rfl: 0    Key: [id number]  Patients Last Name: Claudette  : 1980

## 2024-03-05 NOTE — H&P
NURSING INTAKE COMMENTS:   Chief Complaint   Patient presents with    Knee Pain     New pt- bilateral knees- onset- 15 yrs ago- denies injury- rates pain 5-6/10 most of the time- R is worse than L        HPI: This 43 year old male presents today with bilateral knee osteoarthritis.  His BMI is 61.31 and he weighs 438 pounds.  He is scheduled to see the bariatric center in [place] for the first time this coming Monday.  He needs knee replacements but will have to defer those treatments until he loses significant amounts of weight.    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  He lives with his wife and 3 kids in a house with stairs.  He drives.  He does not use a cane or walker.  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  He recently was diagnosed with diabetes although he has not seen the endocrinologist yet.  That is scheduled next week as well.  Mostly he is a non-smoker although occasionally he will smoke a cigar when golfing.    His knee started hurting about 18 years ago but 2 months ago he tweaked them playing basketball.  Right is worse than left.    History reviewed. No pertinent past medical history.  History reviewed. No pertinent surgical history.  Current Outpatient Medications   Medication Sig Dispense Refill    Testosterone (ANDROGEL) 40.5 MG/2.5GM (1.62%) Transdermal Gel Place 1 Application  onto the skin daily. ICD-9: 257.2 90 each 0    Testosterone (ANDROGEL) 40.5 MG/2.5GM (1.62%) Transdermal Gel Place 1 Application onto the skin daily. 90 each 0     No Known Allergies  History reviewed. No pertinent family history.  No family Hx of DVT/PE    Social History     Occupational History    Not on file   Tobacco Use    Smoking status: Some Days     Types: Cigars, Cigarettes    Smokeless tobacco: Never   Vaping Use    Vaping Use: Never used   Substance and Sexual Activity    Alcohol use: Yes    Drug use: Never    Sexual activity: Not Currently        Review of Systems:  GENERAL: feels generally well, no significant  weight loss or weight gain  SKIN: no ulcerated or worrisome skin lesions  EYES:denies blurred vision or double vision  HEENT: denies new nasal congestion, sinus pain or ST  LUNGS: denies shortness of breath  CARDIOVASCULAR: denies chest pain  GI: no hematemesis, no worsening heartburn, no diarrhea  : no dysuria, no blood in urine, no difficulty urinating, no incontinence  MUSCULOSKELETAL: no other musculoskeletal complaints other than in HPI  NEURO: no numbness or tingling, no weakness or balance disorder  PSYCHE: no depression or anxiety  HEMATOLOGIC: no hx of blood dyscrasia, no Hx DVT/PE  ENDOCRINE: no thyroid or diabetes issues  ALL/ASTHMA: no new hx of severe allergy or asthma    Physical Examination:    /89   Pulse 81   Ht 5' 11\" (1.803 m)   Wt (!) 439 lb 9.6 oz (199.4 kg)   BMI 61.31 kg/m²   Constitutional: appears well hydrated, alert and responsive, no acute distress noted  Extremities: The skin of both legs was healthy without pitting edema or calf tenderness.  The knees show no asymmetric warmth or effusion.  His legs are very large however.  Musculoskeletal: Motion 0 to 105 degrees limited by the size of his thighs.  No instability.  Medial joint line tenderness right worse than left.  Some patellofemoral pain.  No lateral tenderness.  Neurological: Normal motor and sensory bilateral lower extremities and he denies neuropathy.    Imaging: X-rays show near bone-on-bone of the medial and patellofemoral joints bilaterally.      US LIVER (CPT=76705)    Result Date: 2/6/2024  PROCEDURE: US LIVER (CPT=76705)  COMPARISON: None.  INDICATIONS: Elevated liver function tests  TECHNIQUE:   The liver was evaluated with gray scale and colorflow of the main vessels.   FINDINGS:  LIVER:   Enlarged 20.5 cm maximal craniocaudal extent liver.  Diffusely increased hepatic parenchymal echogenicity with poor through transmission.  No gross focal hepatic lesion by ultrasound.  There is correct directional flow in  the main portal vein.  Hepatic veins are not well assessed given poor through transmission. GALLBLADDER/CBD: Cholelithiasis, which likely results in the wall-echo-shadow sign.  There is mild nonspecific 0.5 cm gallbladder wall thickening.  However, no significant gallbladder luminal distention or pericholecystic edema is identified.  There is no pericholecystic fluid or peripheral Doppler hyperemia.  The common bile duct is normal in caliber, measuring 0.3 cm. RIGHT KIDNEY: Survey sagittal images demonstrate no collecting system dilatation. PANCREAS: Views of the pancreas are largely obscured by overlying bowel gas. OTHER:   Negative.         CONCLUSION:  1. Fatty liver with moderate hepatomegaly.  Please note that poor through transmission from fatty change and patient body habitus limits evaluation. 2. Cholelithiasis with nonspecific mild gallbladder wall thickening.  There are, however, no other sonographic manifestations of acute cholecystitis. 3. No biliary ductal dilation. 4. Lesser incidental findings as above.   elm-remote  Dictated by (CST): Glenroy Randolph MD on 2/06/2024 at 9:38 AM     Finalized by (CST): Glenroy Randolph MD on 2/06/2024 at 9:41 AM             Lab Results   Component Value Date    WBC 9.9 02/06/2024    HGB 15.5 02/06/2024    .0 02/06/2024      Lab Results   Component Value Date     (H) 01/03/2024    BUN 12 01/03/2024    CREATSERUM 0.72 01/03/2024    GFRNAA 111 06/30/2022    GFRAA 129 06/30/2022        Assessment and Plan:  Diagnoses and all orders for this visit:    Primary osteoarthritis of right knee  -     Arthrocentesis aspiration and injection major Right joint bursa w/o US  -     triamcinolone acetonide (Kenalog-40) 40 MG/ML injection 40 mg    Knee pain, unspecified chronicity, unspecified laterality  -     XR KNEE (3 VIEWS) AP LAT OBL BILAT EM (CPT=73562-50); Future  -     Arthrocentesis aspiration and injection major Right joint bursa w/o US  -     triamcinolone  acetonide (Kenalog-40) 40 MG/ML injection 40 mg    Primary osteoarthritis of left knee  -     Arthrocentesis aspiration and injection major Right joint bursa w/o US  -     triamcinolone acetonide (Kenalog-40) 40 MG/ML injection 40 mg    Class 3 severe obesity due to excess calories with serious comorbidity and body mass index (BMI) of 60.0 to 69.9 in adult ([facility])        Assessment: Bilateral knee osteoarthritis and morbid obesity with BMI 61.    Plan: I recommended significant weight loss so that he could have knee replacements.  For symptomatic relief currently offered cortisone injection.  Because he is diabetic I offered 1 knee today and he can do the next knee in 3 to 4 weeks.  She wished to start with the right.  Aspiration was negative he tolerated the injection of bupivacaine 0.5% 5 cc and Kenalog 1 cc well to the right knee.    We will see him in 3 to 4 weeks for the left knee injection if he wishes.  For now however I will see him as needed.    Follow Up: No follow-ups on file.    Modesto Bishop MD

## 2024-03-05 NOTE — PROGRESS NOTES
Per verbal order from Dr. Bishop, draw up 5ml of 0.5% Marcaine and 1ml of Kenalog 40 for cortisone injection to right knee. Charla BASS MA  Patient provided education handout for cortisone injection.

## 2024-03-08 ENCOUNTER — OFFICE VISIT (OUTPATIENT)
Facility: CLINIC | Age: 44
End: 2024-03-08

## 2024-03-08 VITALS
HEART RATE: 79 BPM | BODY MASS INDEX: 44.1 KG/M2 | DIASTOLIC BLOOD PRESSURE: 82 MMHG | WEIGHT: 315 LBS | SYSTOLIC BLOOD PRESSURE: 140 MMHG | HEIGHT: 71 IN | OXYGEN SATURATION: 97 %

## 2024-03-08 DIAGNOSIS — R73.09 HIGH GLUCOSE LEVEL: ICD-10-CM

## 2024-03-08 DIAGNOSIS — R74.01 HIGH LIVER TRANSAMINASE LEVEL: ICD-10-CM

## 2024-03-08 DIAGNOSIS — E11.65 UNCONTROLLED TYPE 2 DIABETES MELLITUS WITH HYPERGLYCEMIA (HCC): Primary | ICD-10-CM

## 2024-03-08 DIAGNOSIS — E78.5 DYSLIPIDEMIA: ICD-10-CM

## 2024-03-08 DIAGNOSIS — E66.01 CLASS 3 SEVERE OBESITY WITH BODY MASS INDEX (BMI) OF 50.0 TO 59.9 IN ADULT, UNSPECIFIED OBESITY TYPE, UNSPECIFIED WHETHER SERIOUS COMORBIDITY PRESENT (HCC): ICD-10-CM

## 2024-03-08 DIAGNOSIS — E11.65 HYPERGLYCEMIA DUE TO DIABETES MELLITUS (HCC): ICD-10-CM

## 2024-03-08 DIAGNOSIS — L83 ACANTHOSIS: ICD-10-CM

## 2024-03-08 DIAGNOSIS — K76.0 FATTY LIVER: ICD-10-CM

## 2024-03-08 PROBLEM — E66.813 CLASS 3 SEVERE OBESITY WITH BODY MASS INDEX (BMI) OF 50.0 TO 59.9 IN ADULT (HCC): Status: ACTIVE | Noted: 2024-03-08

## 2024-03-08 PROBLEM — E66.813 CLASS 3 SEVERE OBESITY WITH BODY MASS INDEX (BMI) OF 50.0 TO 59.9 IN ADULT: Status: ACTIVE | Noted: 2024-03-08

## 2024-03-08 LAB
GLUCOSE BLOOD: 131
TEST STRIP EXPIRATION DATE: NORMAL DATE
TEST STRIP LOT #: NORMAL NUMERIC

## 2024-03-08 PROCEDURE — 3077F SYST BP >= 140 MM HG: CPT | Performed by: INTERNAL MEDICINE

## 2024-03-08 PROCEDURE — 82947 ASSAY GLUCOSE BLOOD QUANT: CPT | Performed by: INTERNAL MEDICINE

## 2024-03-08 PROCEDURE — 3008F BODY MASS INDEX DOCD: CPT | Performed by: INTERNAL MEDICINE

## 2024-03-08 PROCEDURE — 99245 OFF/OP CONSLTJ NEW/EST HI 55: CPT | Performed by: INTERNAL MEDICINE

## 2024-03-08 PROCEDURE — 3079F DIAST BP 80-89 MM HG: CPT | Performed by: INTERNAL MEDICINE

## 2024-03-08 RX ORDER — ATORVASTATIN CALCIUM 20 MG/1
20 TABLET, FILM COATED ORAL NIGHTLY
Qty: 90 TABLET | Refills: 0 | Status: SHIPPED | OUTPATIENT
Start: 2024-03-08 | End: 2024-06-06

## 2024-03-08 RX ORDER — BLOOD-GLUCOSE SENSOR
1 EACH MISCELLANEOUS
Qty: 6 EACH | Refills: 1 | Status: SHIPPED | OUTPATIENT
Start: 2024-03-08 | End: 2024-06-06

## 2024-03-08 RX ORDER — TIRZEPATIDE 2.5 MG/.5ML
2.5 INJECTION, SOLUTION SUBCUTANEOUS
Qty: 2 ML | Refills: 3 | Status: SHIPPED | OUTPATIENT
Start: 2024-03-08

## 2024-03-08 RX ORDER — TIRZEPATIDE 5 MG/.5ML
5 INJECTION, SOLUTION SUBCUTANEOUS
Qty: 2 ML | Refills: 0 | Status: SHIPPED | OUTPATIENT
Start: 2024-04-05 | End: 2024-05-03

## 2024-03-08 RX ORDER — METFORMIN HYDROCHLORIDE 500 MG/1
1000 TABLET, EXTENDED RELEASE ORAL 2 TIMES DAILY WITH MEALS
Qty: 360 TABLET | Refills: 1 | Status: SHIPPED | OUTPATIENT
Start: 2024-03-08 | End: 2024-09-04

## 2024-03-08 NOTE — PATIENT INSTRUCTIONS
Will give FSL3 CGM samples and send Rx- might not be covered   Metformin start with 500 mg daily for a week, increase to 500 mg twice a day for a week, then 1000 mg at night and 500 mg in the morning for a week, then increase to 1000 mg twice day. If getting diarrhea, wait and do not increase the dose till the diarrhea resolves.   Start mounjaro 2.5mg weekly for a month. Increase to 5 mg/week if well tolerated   Start atorvastatin     Follow up with [facility] in 2 weeks for CGM download    Labs and follow up in 4 weeks     Will refer to CDE, podiatry, ophthalmology    Check blood sugar fasting, before meals and before bedtime.   If you have low blood sugar <70, take 15 grams of carb (8 oz juice or regular soda) and recheck in 15 minutes.    Follow up with podiatry and eye doctor annually.   Patients need to wear covered shoes all the time and check feet daily.   Bring your meter/BG log to the next visit.      Target A1c 6.5%  Target BG   BEFORE MEAL 100-130mg/dl  2hrs AFTER MEAL less than 180mg/dl    GLP-1 agonists:  No personal or family history of MEN syndrome   No personal history pancreatitis   Patient counselled regarding side effects including injection site reactions, nausea, vomiting, diarrhea, pancreatitis, gastroparesis and rare side effect herbert Twan syndrome.

## 2024-03-08 NOTE — PROGRESS NOTES
New Patient Evaluation - History and Physical    CONSULT - Reason for Visit:  uncontrolled DM.  Requesting Physician:   Suma Hanson DO      CHIEF COMPLAINT:    Chief Complaint   Patient presents with    Consult     Diabetes last A1c 7.5 on 02/06/24  Zion Hanson DO        HISTORY OF PRESENT ILLNESS:   Ricardo Wilkins is a 43 year old male who presents with uncontrolled DM , hyperglycemia, DLP, ALAN, fatty liver, morbid obesity, and insulin resistance    Lab Results   Component Value Date    A1C 7.5 (H) 02/06/2024        DM newly dx in Feb 2024   3/8/2024     Knee pain  had cortisone inj this week    In 2023 started testosterone gel     ALAN and will start CPAP    Fatty liver with high LFTs     Seeing wt loss clinic now but has not started meds.     Denied sugary drinks but drinks etoh       ASSESSMENT AND PLAN:  43 year old male who presents with uncontrolled DM , hyperglycemia, DLP, ALAN, fatty liver, morbid obesity, and insulin resistance      Discussed new dx and long term complications.   D/w tx options and side effect   Will benefit from CDE consult and more education     Plan     Will give FSL3 CGM samples and send Rx- might not be covered   Metformin start with 500 mg daily for a week, increase to 500 mg twice a day for a week, then 1000 mg at night and 500 mg in the morning for a week, then increase to 1000 mg twice day. If getting diarrhea, wait and do not increase the dose till the diarrhea resolves.   Start mounjaro 2.5mg weekly for a month. Increase to 5 mg/week if well tolerated   Start atorvastatin     Follow up with [facility] in 2 weeks for CGM download    Labs and follow up in 4 weeks     Will refer to CDE, podiatry, ophthalmology    Check blood sugar fasting, before meals and before bedtime.   If you have low blood sugar <70, take 15 grams of carb (8 oz juice or regular soda) and recheck in 15 minutes.    Follow up with podiatry and eye doctor annually.   Patients need to wear covered  shoes all the time and check feet daily.   Bring your meter/BG log to the next visit.      Target A1c 6.5%  Target BG   BEFORE MEAL 100-130mg/dl  2hrs AFTER MEAL less than 180mg/dl    GLP-1 agonists:  No personal or family history of MEN syndrome   No personal history pancreatitis   Patient counselled regarding side effects including injection site reactions, nausea, vomiting, diarrhea, pancreatitis, gastroparesis and rare side effect herbert Twan syndrome.        We discussed these in detail with the patient and negotiated which of numerous possible changes in the in the treatment plan that would be acceptable to them. The patient remains at ongoing is at high risk for complications related to uncontrolled diabetes and treatment.  The patient requires a great deal of self-management and support. We expect the patient's risk to be reduced with the changes to the treatment plan that we recommended today.       PAST MEDICAL HISTORY:   History reviewed. No pertinent past medical history.  DM  DLP  Low testosterone   Fatty liver     PAST SURGICAL HISTORY:   History reviewed. No pertinent surgical history.  None     CURRENT MEDICATIONS:     metFORMIN  MG Oral Tablet 24 Hr Take 2 tablets (1,000 mg total) by mouth 2 (two) times daily with meals. 360 tablet 1    Tirzepatide (MOUNJARO) 2.5 MG/0.5ML Subcutaneous Solution Pen-injector Inject 2.5 mg into the skin every 7 days. 2 mL 3    [START ON 4/5/2024] Tirzepatide (MOUNJARO) 5 MG/0.5ML Subcutaneous Solution Pen-injector Inject 5 mg into the skin every 7 days for 28 days. 2 mL 0    Continuous Blood Gluc Sensor (FREESTYLE DAKSHA 3 SENSOR) Does not apply Misc 1 each every 14 (fourteen) days. 6 each 1    atorvastatin 20 MG Oral Tab Take 1 tablet (20 mg total) by mouth nightly. 90 tablet 0    Testosterone (ANDROGEL) 40.5 MG/2.5GM (1.62%) Transdermal Gel Place 1 Application  onto the skin daily. ICD-9: 257.2 90 each 0    Testosterone (ANDROGEL) 40.5 MG/2.5GM (1.62%)  Transdermal Gel Place 1 Application onto the skin daily. 90 each 0         ALLERGIES:  No Known Allergies  None    SOCIAL HISTORY:    Social History     Socioeconomic History    Marital status:    Tobacco Use    Smoking status: Some Days     Types: Cigars, Cigarettes    Smokeless tobacco: Never   Vaping Use    Vaping Use: Never used   Substance and Sexual Activity    Alcohol use: Yes    Drug use: Never    Sexual activity: Not Currently       FAMILY HISTORY:   History reviewed. No pertinent family history.   GF with DM    REVIEW OF SYSTEMS:  All negative other than HPI      PHYSICAL EXAM:   Height: 5' 11\" (180.3 cm) (03/08 0757)  Weight: 430 lb (195 kg) (03/08 0757)  BSA (Calculated - sq m): 2.92 sq meters (03/08 0757)  Pulse: 79 (03/08 0757)  BP: 140/82 (03/08 0757)  Temp: --  Do Not Use - Resp Rate: --  SpO2: 97 % (03/08 0757)    Body mass index is 59.97 kg/m².  No tremors   No goiter   Skin tags  Acanthosis   No abd tenderness   CONSTITUTIONAL:  Awake and alert. Age appropriate, good hygiene not in acute distress. Well nourished and well developed. no acute distress   PSYCH:   Orientated to time, place, person & situation, Normal mood and affect, memory intact, normal insight and judgment, cooperative  Neuro: speech is clear. Awake, alert, no aphasia, no facial asymmetry, no nuchal rigidity  EYES:  No proptosis, no ptosis, conjunctiva normal  ENT:  Normocephalic, atraumatic  Eye: EOMI, normal lids, no discharge, no conjunctival erythema. No exophthalmos/proptosis, Ptosis negative   No rhinorrhea, moist oral mucosa  Neck: full range of motion  Neck/Thyroid: neck inspection: normal, No scar, No goiter   LUNGS:  No acute respiratory distress, non-labored respiration. Speaking full sentences  CARDIOVASCULAR:  regular rate   ABDOMEN:  No abdominal pain.   SKIN:  no bruising or bleeding, no rashes and no lesions, Skin is dry, no obvious rashes or lesions  EXTREMITIES: no gross abnormality   MSK: Moves extremities  spontaneously. full range of motion in all major joints      DATA:     Pertinent data reviewed   US liver 2024      Impression   CONCLUSION:  1. Fatty liver with moderate hepatomegaly.  Please note that poor through transmission from fatty change and patient body habitus limits evaluation.  2. Cholelithiasis with nonspecific mild gallbladder wall thickening.  There are, however, no other sonographic manifestations of acute cholecystitis.  3. No biliary ductal dilation.  4. Lesser incidental findings as above.      Latest Reference Range & Units Most Recent   EGFR >=60 mL/min/1.73m2 117  1/3/24 08:11   eGFR NON-AFR. AMERICAN >=60  111  6/30/22 16:05   eGFR  >=60  129  6/30/22 16:05   ANION GAP 0 - 18 mmol/L 4  1/3/24 08:11   CALCULATED OSMOLALITY 275 - 295 mOsm/kg 288  1/3/24 08:11   ALKALINE PHOSPHATASE 45 - 117 U/L 80  1/3/24 08:11   AST (SGOT) <=34 U/L 41 (H)  1/3/24 08:11   ALT (SGPT) 10 - 49 U/L 52 (H)  1/3/24 08:11   Total Bilirubin 0.3 - 1.2 mg/dL 0.4  1/3/24 08:11   Globulin 2.8 - 4.4 g/dL 3.4  1/3/24 08:11   Cholesterol, Total <200 mg/dL 219 (H)  1/3/24 08:11   Triglycerides 30 - 149 mg/dL 163 (H)  1/3/24 08:11   HDL Cholesterol 40 - 59 mg/dL 43  1/3/24 08:11   VLDL 0 - 30 mg/dL 31 (H)  1/3/24 08:11   NON-HDL CHOLESTEROL <130 mg/dL 176 (H)  1/3/24 08:11   LDL Cholesterol Calc <100 mg/dL 146 (H)  1/3/24 08:11   (H): Data is abnormally high   Latest Reference Range & Units 02/06/24 08:02   TSH 0.550 - 4.780 mIU/mL 2.861                  No results for input(s): \"TSH\", \"T4F\", \"T3F\", \"THYP\" in the last 72 hours.  No results found.    POC HemoCue Glucose 201 (Finger stick glucose)        Component Value Flag Ref Range Units Status    GLUCOSE BLOOD 131        Final    Test Strip Lot # [id number]       Numeric Final    Test Strip Expiration Date 81,624       Date Final                    Orders Placed This Encounter   Procedures    POC HemoCue Glucose 201 (Finger stick glucose)    Microalb/Creat  Ratio, Random Urine    Comp Metabolic Panel [E]     Orders Placed This Encounter    POC HemoCue Glucose 201 (Finger stick glucose)     Order Specific Question:   Release to patient     Answer:   Immediate    Microalb/Creat Ratio, Random Urine     Standing Status:   Future     Standing Expiration Date:   3/8/2025     Order Specific Question:   Release to patient     Answer:   Immediate    Comp Metabolic Panel [E]     Standing Status:   Future     Standing Expiration Date:   3/8/2025     Order Specific Question:   Release to patient     Answer:   Immediate    metFORMIN  MG Oral Tablet 24 Hr     Sig: Take 2 tablets (1,000 mg total) by mouth 2 (two) times daily with meals.     Dispense:  360 tablet     Refill:  1    Tirzepatide (MOUNJARO) 2.5 MG/0.5ML Subcutaneous Solution Pen-injector     Sig: Inject 2.5 mg into the skin every 7 days.     Dispense:  2 mL     Refill:  3    Tirzepatide (MOUNJARO) 5 MG/0.5ML Subcutaneous Solution Pen-injector     Sig: Inject 5 mg into the skin every 7 days for 28 days.     Dispense:  2 mL     Refill:  0    Continuous Blood Gluc Sensor (FREESTYLE DAKSHA 3 SENSOR) Does not apply Misc     Si each every 14 (fourteen) days.     Dispense:  6 each     Refill:  1    atorvastatin 20 MG Oral Tab     Sig: Take 1 tablet (20 mg total) by mouth nightly.     Dispense:  90 tablet     Refill:  0          This is a specialized patient consultation in endocrinology and required comprehensive review of prior records, as well as current evaluation, with time required for consideration of complex endocrine issues and consultation. For this visit, I personally interviewed the patient, and family member if accompanied, performed the pertinent parts of the history and physical examination. ROS included screening for appropriate endocrine conditions.   Today's diagnosis and plan were reviewed in detail with the patient who states understanding and agrees with plan. I discussed with the patient possible  diagnosis, differential diagnosis, need for work up , treatment options, alternatives and side effects.     Please see note for details about time spent which includes:   · pre-visit preparation  · reviewing records  · face to face time with the patient   · timely documentation of the encounter  · ordering medications/tests  · communication with care team  · care coordination    I appreciate the opportunity to be part of your patient's medical care and will keep you, as the referring and primary physicians, informed about the care of your patient, including possible future surgery and pathology findings. Please feel free to contact me should you have any questions.      Randy Reis MD

## 2024-03-15 ENCOUNTER — OFFICE VISIT (OUTPATIENT)
Dept: SLEEP CENTER | Age: 44
End: 2024-03-15
Attending: FAMILY MEDICINE
Payer: COMMERCIAL

## 2024-03-15 DIAGNOSIS — G47.33 OSA (OBSTRUCTIVE SLEEP APNEA): ICD-10-CM

## 2024-03-15 PROCEDURE — 95811 POLYSOM 6/>YRS CPAP 4/> PARM: CPT

## 2024-03-19 NOTE — PROGRESS NOTES
Continuous Glucose Monitor Download    Start Time: 10:01am  End Time: 10:50am    Indication: Pt is T2D followed by Dr Reis. At last appointment/consult MA 3/8/2024, advised to start Mounjaro and Metformin. Additionally, Libre3 started in office. Appointment today for download.    A1c: 7.5% (2/6/2024)    Current Diabetes Medication:  Metformin 1000mg BID --> Started Joseph 3/24  Mounjaro 2.5mg/weekly for 1mo and then 5mg --> Started Joseph 3/24    Current Nutrition Intake  Breakfast: Small amount of oatmeal or fruit  Lunch: Salads  Dinner: Chicken with pasta; mexican steak with beans; Did have dominoes  Snacks: Nuts (skipping chocolate)  Beverages: Water; denies juice or pop    Sensor Type: Libre3    Reviewed CGMS download and patient logs:  Days of sensor use: 3/8/2024-3/18/24  Average glucose (mg/dL): 135 mg/dl  Estimated GMI: 6.5%  Standard deviation =/- 22.1 (mg/dL)  Target Ranges:  mg/dL          93% of time in range  1% of time below range  6% of time above range      Recommended medication changes/Plan:  - Reviewed Libre3 download which demonstrates stable blood sugar levels. Fasting blood sugars typically 110-140's mg/dl. Blood sugars rises with meals to 160-180 mg/dl and typically results in post prandial of <150 mg/dl. No occurrences of true low blood sugars (a few inaccurate/compression lows)  - Libre3 download represents blood sugars prior to starting medication  - Metformin and Mounjaro started yesterday 3/24  - Reviewed MOA of Mounjaro  - Reviewed diet with pt and meal planning handout provided.  Pt will be meeting with bariatrics/RD on 4/9  - Dexcom G7 sent in as insurance states that is what is covered  - [employer] verio testing supplies sent in incase Dexcom is not covered; advised to check 1x daily alternating  - Per TE 3/21, we rescheduled pt's appt for 4/26 after pt has been on new medication for 1mo  - Advised to reach out with any questions/concerns prior to appt    Updated Diabetes  Medication:  Metformin 1000mg BID  Mounjaro 2.5mg/weekly for 1mo then 5mg/weekly (Sundays)    Follow up:  4/26/2024 Dr Chato Ospina  [facility]

## 2024-03-21 ENCOUNTER — TELEPHONE (OUTPATIENT)
Dept: ENDOCRINOLOGY CLINIC | Facility: CLINIC | Age: 44
End: 2024-03-21

## 2024-03-21 NOTE — TELEPHONE ENCOUNTER
Pt asking if he should still come in for 1 mo f/u appt when he has not been on meds that long yet due to ins issues

## 2024-03-22 NOTE — TELEPHONE ENCOUNTER
This Rn called patient to further assess the length of time he has been on the medications as prescribed by Dr. Reis. No answer, LVM.    Dr Reis, Should patient keep his appointment as scheduled regardless of the time he has been on  diabetes medications ?   Please  advice.

## 2024-03-25 ENCOUNTER — NURSE ONLY (OUTPATIENT)
Dept: ENDOCRINOLOGY CLINIC | Facility: CLINIC | Age: 44
End: 2024-03-25
Payer: COMMERCIAL

## 2024-03-25 DIAGNOSIS — E11.65 UNCONTROLLED TYPE 2 DIABETES MELLITUS WITH HYPERGLYCEMIA (HCC): Primary | ICD-10-CM

## 2024-03-25 PROCEDURE — 99211 OFF/OP EST MAY X REQ PHY/QHP: CPT | Performed by: INTERNAL MEDICINE

## 2024-03-25 RX ORDER — BLOOD SUGAR DIAGNOSTIC
STRIP MISCELLANEOUS
Qty: 100 STRIP | Refills: 0 | Status: SHIPPED | OUTPATIENT
Start: 2024-03-25

## 2024-03-25 RX ORDER — ACYCLOVIR 400 MG/1
1 TABLET ORAL
Qty: 9 EACH | Refills: 0 | Status: SHIPPED | OUTPATIENT
Start: 2024-03-25

## 2024-03-25 RX ORDER — LANCETS 33 GAUGE
EACH MISCELLANEOUS
Qty: 100 EACH | Refills: 0 | Status: SHIPPED | OUTPATIENT
Start: 2024-03-25

## 2024-03-25 RX ORDER — BLOOD-GLUCOSE METER
1 EACH MISCELLANEOUS AS DIRECTED
Qty: 1 KIT | Refills: 0 | Status: SHIPPED | OUTPATIENT
Start: 2024-03-25

## 2024-04-23 ENCOUNTER — LAB ENCOUNTER (OUTPATIENT)
Dept: LAB | Age: 44
End: 2024-04-23
Attending: INTERNAL MEDICINE
Payer: COMMERCIAL

## 2024-04-23 DIAGNOSIS — E11.65 UNCONTROLLED TYPE 2 DIABETES MELLITUS WITH HYPERGLYCEMIA (HCC): ICD-10-CM

## 2024-04-23 DIAGNOSIS — E11.65 HYPERGLYCEMIA DUE TO DIABETES MELLITUS (HCC): ICD-10-CM

## 2024-04-23 DIAGNOSIS — R73.09 HIGH GLUCOSE LEVEL: ICD-10-CM

## 2024-04-23 DIAGNOSIS — E66.01 CLASS 3 SEVERE OBESITY WITH BODY MASS INDEX (BMI) OF 50.0 TO 59.9 IN ADULT, UNSPECIFIED OBESITY TYPE, UNSPECIFIED WHETHER SERIOUS COMORBIDITY PRESENT (HCC): ICD-10-CM

## 2024-04-23 DIAGNOSIS — E78.5 DYSLIPIDEMIA: ICD-10-CM

## 2024-04-23 LAB
ALBUMIN SERPL-MCNC: 4.6 G/DL (ref 3.2–4.8)
ALBUMIN/GLOB SERPL: 1.4 {RATIO} (ref 1–2)
ALP LIVER SERPL-CCNC: 74 U/L
ALT SERPL-CCNC: 41 U/L
ANION GAP SERPL CALC-SCNC: 8 MMOL/L (ref 0–18)
AST SERPL-CCNC: 36 U/L (ref ?–34)
BILIRUB SERPL-MCNC: 0.7 MG/DL (ref 0.3–1.2)
BUN BLD-MCNC: 14 MG/DL (ref 9–23)
BUN/CREAT SERPL: 15.6 (ref 10–20)
CALCIUM BLD-MCNC: 9.8 MG/DL (ref 8.7–10.4)
CHLORIDE SERPL-SCNC: 106 MMOL/L (ref 98–112)
CO2 SERPL-SCNC: 26 MMOL/L (ref 21–32)
CREAT BLD-MCNC: 0.9 MG/DL
CREAT UR-SCNC: 237.7 MG/DL
EGFRCR SERPLBLD CKD-EPI 2021: 109 ML/MIN/1.73M2 (ref 60–?)
FASTING STATUS PATIENT QL REPORTED: YES
GLOBULIN PLAS-MCNC: 3.4 G/DL (ref 2.8–4.4)
GLUCOSE BLD-MCNC: 95 MG/DL (ref 70–99)
MICROALBUMIN UR-MCNC: 0.8 MG/DL
MICROALBUMIN/CREAT 24H UR-RTO: 3.4 UG/MG (ref ?–30)
OSMOLALITY SERPL CALC.SUM OF ELEC: 290 MOSM/KG (ref 275–295)
POTASSIUM SERPL-SCNC: 4.1 MMOL/L (ref 3.5–5.1)
PROT SERPL-MCNC: 8 G/DL (ref 5.7–8.2)
SODIUM SERPL-SCNC: 140 MMOL/L (ref 136–145)

## 2024-04-23 PROCEDURE — 82043 UR ALBUMIN QUANTITATIVE: CPT

## 2024-04-23 PROCEDURE — 80053 COMPREHEN METABOLIC PANEL: CPT

## 2024-04-23 PROCEDURE — 36415 COLL VENOUS BLD VENIPUNCTURE: CPT

## 2024-04-23 PROCEDURE — 82570 ASSAY OF URINE CREATININE: CPT

## 2024-04-26 ENCOUNTER — OFFICE VISIT (OUTPATIENT)
Facility: CLINIC | Age: 44
End: 2024-04-26

## 2024-04-26 VITALS
HEART RATE: 76 BPM | SYSTOLIC BLOOD PRESSURE: 130 MMHG | BODY MASS INDEX: 44.1 KG/M2 | HEIGHT: 71 IN | WEIGHT: 315 LBS | OXYGEN SATURATION: 96 % | DIASTOLIC BLOOD PRESSURE: 78 MMHG

## 2024-04-26 DIAGNOSIS — E11.65 HYPERGLYCEMIA DUE TO DIABETES MELLITUS (HCC): ICD-10-CM

## 2024-04-26 DIAGNOSIS — E78.5 DYSLIPIDEMIA: ICD-10-CM

## 2024-04-26 DIAGNOSIS — E11.65 UNCONTROLLED TYPE 2 DIABETES MELLITUS WITH HYPERGLYCEMIA (HCC): Primary | ICD-10-CM

## 2024-04-26 DIAGNOSIS — E66.01 CLASS 3 SEVERE OBESITY WITH BODY MASS INDEX (BMI) OF 50.0 TO 59.9 IN ADULT, UNSPECIFIED OBESITY TYPE, UNSPECIFIED WHETHER SERIOUS COMORBIDITY PRESENT (HCC): ICD-10-CM

## 2024-04-26 DIAGNOSIS — K76.0 FATTY LIVER: ICD-10-CM

## 2024-04-26 DIAGNOSIS — R74.01 HIGH LIVER TRANSAMINASE LEVEL: ICD-10-CM

## 2024-04-26 DIAGNOSIS — L83 ACANTHOSIS: ICD-10-CM

## 2024-04-26 DIAGNOSIS — R73.09 HIGH GLUCOSE LEVEL: ICD-10-CM

## 2024-04-26 LAB
GLUCOSE BLOOD: 109
TEST STRIP EXPIRATION DATE: NORMAL DATE
TEST STRIP LOT #: NORMAL NUMERIC

## 2024-04-26 PROCEDURE — 3008F BODY MASS INDEX DOCD: CPT | Performed by: INTERNAL MEDICINE

## 2024-04-26 PROCEDURE — 99214 OFFICE O/P EST MOD 30 MIN: CPT | Performed by: INTERNAL MEDICINE

## 2024-04-26 PROCEDURE — 3078F DIAST BP <80 MM HG: CPT | Performed by: INTERNAL MEDICINE

## 2024-04-26 PROCEDURE — 3075F SYST BP GE 130 - 139MM HG: CPT | Performed by: INTERNAL MEDICINE

## 2024-04-26 PROCEDURE — 82947 ASSAY GLUCOSE BLOOD QUANT: CPT | Performed by: INTERNAL MEDICINE

## 2024-04-26 PROCEDURE — 3061F NEG MICROALBUMINURIA REV: CPT | Performed by: INTERNAL MEDICINE

## 2024-04-26 RX ORDER — TIRZEPATIDE 7.5 MG/.5ML
7.5 INJECTION, SOLUTION SUBCUTANEOUS
Qty: 6 ML | Refills: 0 | Status: SHIPPED | OUTPATIENT
Start: 2024-04-26 | End: 2024-07-19

## 2024-04-26 RX ORDER — ATORVASTATIN CALCIUM 20 MG/1
20 TABLET, FILM COATED ORAL NIGHTLY
Qty: 90 TABLET | Refills: 0 | Status: SHIPPED | OUTPATIENT
Start: 2024-04-26 | End: 2024-07-25

## 2024-04-26 RX ORDER — METFORMIN HYDROCHLORIDE 500 MG/1
1000 TABLET, EXTENDED RELEASE ORAL 2 TIMES DAILY WITH MEALS
Qty: 360 TABLET | Refills: 1 | Status: SHIPPED | OUTPATIENT
Start: 2024-04-26 | End: 2024-10-23

## 2024-04-26 NOTE — PROGRESS NOTES
Reason for Visit:  uncontrolled DM.    Requesting Physician:   Suma Hanson DO      CHIEF COMPLAINT:    Chief Complaint   Patient presents with    Diabetes     Last A1c 7.2        HISTORY OF PRESENT ILLNESS:   iRcardo Wilkins is a 43 year old male who presents with uncontrolled DM , hyperglycemia, DLP, ALAN, fatty liver, morbid obesity, and insulin resistance      DM newly dx in Feb 2024    On MTF 1000 mg bid and mounjaro 2.5 mg/week now  4/26/2024     Wt Readings from Last 6 Encounters:   04/26/24 (!) 409 lb (185.5 kg)   03/08/24 (!) 430 lb (195 kg)   03/04/24 (!) 439 lb 9.6 oz (199.4 kg)   12/19/23 (!) 440 lb (199.6 kg)   06/30/22 (!) 423 lb (191.9 kg)   06/09/20 (!) 411 lb (186.4 kg)     BG at home controlled   ALAN Started using CPAP  Saw 2 RD - DM and bariatric   Changed lifestyle   Started working out    In 2023 started testosterone gel       Fatty liver with high LFTs     Seeing wt loss clinic now and planning bariatric surgeyr.     Denied sugary drinks    Lab Results   Component Value Date    A1C 7.5 (H) 02/06/2024          ASSESSMENT AND PLAN:  43 year old male who presents with uncontrolled DM , hyperglycemia, DLP, ALAN, fatty liver, morbid obesity, and insulin resistance  Discussed new dx and long term complications.   D/w tx options and side effect    Happy with the result   On MTF and mounjaro now - will titrate as tolerated.   On Lipitor         Plan  D3 2000 unit daily   Metformin  1000 mg twice day.     Mounjaro  5mg weekly for a month. Increase to 7.5 mg/week if well tolerated   Atorvastatin 20   Using CPAP now   Labs in May to check A1c   F/u in 3 mo   Follow up with podiatry and eye doctor annually.   Patients need to wear covered shoes all the time and check feet daily.   Bring your meter/BG log to the next visit.      Target A1c 6.5%  Target BG   BEFORE MEAL 100-130mg/dl  2hrs AFTER MEAL less than 180mg/dl    GLP-1 agonists:  No personal or family history of MEN syndrome   No  personal history pancreatitis   Patient counselled regarding side effects including injection site reactions, nausea, vomiting, diarrhea, pancreatitis, gastroparesis and rare side effect herbert Twan syndrome.        We discussed these in detail with the patient and negotiated which of numerous possible changes in the in the treatment plan that would be acceptable to them. The patient remains at ongoing is at high risk for complications related to uncontrolled diabetes and treatment.  The patient requires a great deal of self-management and support. We expect the patient's risk to be reduced with the changes to the treatment plan that we recommended today.       PAST MEDICAL HISTORY:   History reviewed. No pertinent past medical history.  DM  DLP  Low testosterone   Fatty liver     PAST SURGICAL HISTORY:   History reviewed. No pertinent surgical history.  None     CURRENT MEDICATIONS:     Tirzepatide (MOUNJARO) 7.5 MG/0.5ML Subcutaneous Solution Pen-injector Inject 7.5 mg into the skin every 7 days. 6 mL 0    atorvastatin 20 MG Oral Tab Take 1 tablet (20 mg total) by mouth nightly. 90 tablet 0    metFORMIN  MG Oral Tablet 24 Hr Take 2 tablets (1,000 mg total) by mouth 2 (two) times daily with meals. 360 tablet 1    Continuous Blood Gluc Sensor (DEXCOM G7 SENSOR) Does not apply Misc 1 each Every 10 days. Use as directed every 10 days 9 each 0    Blood Glucose Monitoring Suppl (ONETOUCH VERIO) w/Device Does not apply Kit 1 each As Directed. Use to monitor blood sugar levels 1x daily 1 kit 0    Glucose Blood (ONETOUCH VERIO) In Vitro Strip Use to check blood sugar 1x daily 100 strip 0    OneTouch Delica Lancets 33G Does not apply Misc Use to check blood sugar 1x daily 100 each 0    Continuous Blood Gluc Sensor (FREESTYLE DAKSHA 3 SENSOR) Does not apply Misc 1 each every 14 (fourteen) days. 6 each 1    Testosterone (ANDROGEL) 40.5 MG/2.5GM (1.62%) Transdermal Gel Place 1 Application  onto the skin daily. ICD-9:  257.2 90 each 0    Testosterone (ANDROGEL) 40.5 MG/2.5GM (1.62%) Transdermal Gel Place 1 Application onto the skin daily. 90 each 0         ALLERGIES:  No Known Allergies  None    SOCIAL HISTORY:    Social History     Socioeconomic History    Marital status:    Tobacco Use    Smoking status: Some Days     Types: Cigars, Cigarettes    Smokeless tobacco: Never   Vaping Use    Vaping status: Never Used   Substance and Sexual Activity    Alcohol use: Yes    Drug use: Never    Sexual activity: Not Currently       FAMILY HISTORY:   History reviewed. No pertinent family history.   GF with DM     PHYSICAL EXAM:   Height: 5' 11\" (180.3 cm) (04/26 0803)  Weight: 409 lb (185.5 kg) (04/26 0803)  BSA (Calculated - sq m): 2.86 sq meters (04/26 0803)  Pulse: 76 (04/26 0803)  BP: 130/78 (04/26 0803)  Temp: --  Do Not Use - Resp Rate: --  SpO2: 96 % (04/26 0803)    Body mass index is 57.04 kg/m².  No tremors   No goiter    CONSTITUTIONAL:  Awake and alert. Age appropriate, good hygiene not in acute distress. Well nourished and well developed. no acute distress   PSYCH:   Orientated to time, place, person & situation, Normal mood and affect, memory intact, normal insight and judgment, cooperative  Neuro: speech is clear. Awake, alert, no aphasia, no facial asymmetry, no nuchal rigidity  EYES:  No proptosis, no ptosis, conjunctiva normal  ENT:  Normocephalic, atraumatic  Eye: EOMI, normal lids, no discharge, no conjunctival erythema. No exophthalmos/proptosis, Ptosis negative   No rhinorrhea, moist oral mucosa  Neck: full range of motion     DATA:     Pertinent data reviewed    Latest Reference Range & Units 04/23/24 07:45   CREATININE UR RANDOM mg/dL 237.70   MALB URINE mg/dL 0.80   MALB/CRE CALC <=30.0 ug/mg 3.4        US liver 2024      Impression   CONCLUSION:  1. Fatty liver with moderate hepatomegaly.  Please note that poor through transmission from fatty change and patient body habitus limits evaluation.  2.  Cholelithiasis with nonspecific mild gallbladder wall thickening.  There are, however, no other sonographic manifestations of acute cholecystitis.  3. No biliary ductal dilation.  4. Lesser incidental findings as above.      Latest Reference Range & Units Most Recent   EGFR >=60 mL/min/1.73m2 117  1/3/24 08:11   eGFR NON-AFR. AMERICAN >=60  111  6/30/22 16:05   eGFR  >=60  129  6/30/22 16:05   ANION GAP 0 - 18 mmol/L 4  1/3/24 08:11   CALCULATED OSMOLALITY 275 - 295 mOsm/kg 288  1/3/24 08:11   ALKALINE PHOSPHATASE 45 - 117 U/L 80  1/3/24 08:11   AST (SGOT) <=34 U/L 41 (H)  1/3/24 08:11   ALT (SGPT) 10 - 49 U/L 52 (H)  1/3/24 08:11   Total Bilirubin 0.3 - 1.2 mg/dL 0.4  1/3/24 08:11   Globulin 2.8 - 4.4 g/dL 3.4  1/3/24 08:11   Cholesterol, Total <200 mg/dL 219 (H)  1/3/24 08:11   Triglycerides 30 - 149 mg/dL 163 (H)  1/3/24 08:11   HDL Cholesterol 40 - 59 mg/dL 43  1/3/24 08:11   VLDL 0 - 30 mg/dL 31 (H)  1/3/24 08:11   NON-HDL CHOLESTEROL <130 mg/dL 176 (H)  1/3/24 08:11   LDL Cholesterol Calc <100 mg/dL 146 (H)  1/3/24 08:11   (H): Data is abnormally high   Latest Reference Range & Units 02/06/24 08:02   TSH 0.550 - 4.780 mIU/mL 2.861                No results for input(s): \"TSH\", \"T4F\", \"T3F\", \"THYP\" in the last 72 hours.  No results found.    POC HemoCue Glucose 201 (Finger stick glucose)        Component Value Flag Ref Range Units Status    GLUCOSE BLOOD 109        Final    Test Strip Lot # [id number]       Numeric Final    Test Strip Expiration Date 101,124       Date Final                    Orders Placed This Encounter   Procedures    POC HemoCue Glucose 201 (Finger stick glucose)    Hemoglobin A1C [E]     Orders Placed This Encounter    POC HemoCue Glucose 201 (Finger stick glucose)     Order Specific Question:   Release to patient     Answer:   Immediate    Hemoglobin A1C [E]     Standing Status:   Future     Standing Expiration Date:   4/26/2025     Order Specific Question:   Release to  patient     Answer:   Immediate    Tirzepatide (MOUNJARO) 7.5 MG/0.5ML Subcutaneous Solution Pen-injector     Sig: Inject 7.5 mg into the skin every 7 days.     Dispense:  6 mL     Refill:  0    atorvastatin 20 MG Oral Tab     Sig: Take 1 tablet (20 mg total) by mouth nightly.     Dispense:  90 tablet     Refill:  0    metFORMIN  MG Oral Tablet 24 Hr     Sig: Take 2 tablets (1,000 mg total) by mouth 2 (two) times daily with meals.     Dispense:  360 tablet     Refill:  1          This is a specialized patient consultation in endocrinology and required comprehensive review of prior records, as well as current evaluation, with time required for consideration of complex endocrine issues and consultation. For this visit, I personally interviewed the patient, and family member if accompanied, performed the pertinent parts of the history and physical examination. ROS included screening for appropriate endocrine conditions.   Today's diagnosis and plan were reviewed in detail with the patient who states understanding and agrees with plan. I discussed with the patient possible diagnosis, differential diagnosis, need for work up , treatment options, alternatives and side effects.     Please see note for details about time spent which includes:   · pre-visit preparation  · reviewing records  · face to face time with the patient   · timely documentation of the encounter  · ordering medications/tests  · communication with care team  · care coordination    I appreciate the opportunity to be part of your patient's medical care and will keep you, as the referring and primary physicians, informed about the care of your patient, including possible future surgery and pathology findings. Please feel free to contact me should you have any questions.      Randy Reis MD

## 2024-04-26 NOTE — PATIENT INSTRUCTIONS
Wt Readings from Last 6 Encounters:   04/26/24 (!) 409 lb (185.5 kg)   03/08/24 (!) 430 lb (195 kg)   03/04/24 (!) 439 lb 9.6 oz (199.4 kg)   12/19/23 (!) 440 lb (199.6 kg)   06/30/22 (!) 423 lb (191.9 kg)   06/09/20 (!) 411 lb (186.4 kg)       D3 2000 unit daily   Metformin  1000 mg twice day.     Mounjaro  5mg weekly for a month. Increase to 7.5 mg/week if well tolerated     Atorvastatin 20   Using CPAP now   Labs in May to check A1c   F/u in 3 mo

## 2024-05-29 ENCOUNTER — LAB ENCOUNTER (OUTPATIENT)
Dept: LAB | Facility: REFERENCE LAB | Age: 44
End: 2024-05-29
Attending: INTERNAL MEDICINE
Payer: COMMERCIAL

## 2024-05-29 DIAGNOSIS — L83 ACANTHOSIS: ICD-10-CM

## 2024-05-29 DIAGNOSIS — E78.5 DYSLIPIDEMIA: ICD-10-CM

## 2024-05-29 DIAGNOSIS — R74.01 HIGH LIVER TRANSAMINASE LEVEL: ICD-10-CM

## 2024-05-29 DIAGNOSIS — E66.01 CLASS 3 SEVERE OBESITY WITH BODY MASS INDEX (BMI) OF 50.0 TO 59.9 IN ADULT, UNSPECIFIED OBESITY TYPE, UNSPECIFIED WHETHER SERIOUS COMORBIDITY PRESENT (HCC): ICD-10-CM

## 2024-05-29 DIAGNOSIS — E11.65 UNCONTROLLED TYPE 2 DIABETES MELLITUS WITH HYPERGLYCEMIA (HCC): ICD-10-CM

## 2024-05-29 DIAGNOSIS — K76.0 FATTY LIVER: ICD-10-CM

## 2024-05-29 DIAGNOSIS — R73.09 HIGH GLUCOSE LEVEL: ICD-10-CM

## 2024-05-29 DIAGNOSIS — E11.65 HYPERGLYCEMIA DUE TO DIABETES MELLITUS (HCC): ICD-10-CM

## 2024-05-29 LAB
EST. AVERAGE GLUCOSE BLD GHB EST-MCNC: 114 MG/DL (ref 68–126)
HBA1C MFR BLD: 5.6 % (ref ?–5.7)

## 2024-05-29 PROCEDURE — 83036 HEMOGLOBIN GLYCOSYLATED A1C: CPT

## 2024-05-29 PROCEDURE — 36415 COLL VENOUS BLD VENIPUNCTURE: CPT

## 2024-06-04 ENCOUNTER — TELEPHONE (OUTPATIENT)
Dept: FAMILY MEDICINE CLINIC | Facility: CLINIC | Age: 44
End: 2024-06-04

## 2024-07-09 ENCOUNTER — OFFICE VISIT (OUTPATIENT)
Dept: FAMILY MEDICINE CLINIC | Facility: CLINIC | Age: 44
End: 2024-07-09

## 2024-07-09 VITALS
HEIGHT: 71 IN | BODY MASS INDEX: 44.1 KG/M2 | WEIGHT: 315 LBS | SYSTOLIC BLOOD PRESSURE: 112 MMHG | HEART RATE: 65 BPM | DIASTOLIC BLOOD PRESSURE: 84 MMHG | TEMPERATURE: 98 F

## 2024-07-09 DIAGNOSIS — E11.9 TYPE 2 DIABETES MELLITUS WITHOUT COMPLICATION, WITHOUT LONG-TERM CURRENT USE OF INSULIN (HCC): ICD-10-CM

## 2024-07-09 DIAGNOSIS — E66.01 CLASS 3 SEVERE OBESITY WITH SERIOUS COMORBIDITY AND BODY MASS INDEX (BMI) OF 50.0 TO 59.9 IN ADULT, UNSPECIFIED OBESITY TYPE (HCC): ICD-10-CM

## 2024-07-09 DIAGNOSIS — G47.33 OSA ON CPAP: ICD-10-CM

## 2024-07-09 DIAGNOSIS — Z01.818 PRE-OP EXAMINATION: Primary | ICD-10-CM

## 2024-07-09 PROCEDURE — 3008F BODY MASS INDEX DOCD: CPT | Performed by: FAMILY MEDICINE

## 2024-07-09 PROCEDURE — 3079F DIAST BP 80-89 MM HG: CPT | Performed by: FAMILY MEDICINE

## 2024-07-09 PROCEDURE — 99213 OFFICE O/P EST LOW 20 MIN: CPT | Performed by: FAMILY MEDICINE

## 2024-07-09 PROCEDURE — 3074F SYST BP LT 130 MM HG: CPT | Performed by: FAMILY MEDICINE

## 2024-07-09 PROCEDURE — 3044F HG A1C LEVEL LT 7.0%: CPT | Performed by: FAMILY MEDICINE

## 2024-07-09 NOTE — PATIENT INSTRUCTIONS
All labs and EKG and chest x-ray results reviewed.  Patient medically cleared for proposed duodenal switch bariatric surgery on July 24, 2024.  Clearance form has been completed so that it can be returned with today's clearance evaluation attached.  Patient has been instructed to discontinue his testosterone gel as well.

## 2024-07-09 NOTE — PROGRESS NOTES
Subjective:     Patient ID: Ricardo Wilkins is a 43 year old male.    This patient is now established 43-year-old  gentleman with a history of diabetes and now confirmed sleep apnea who is doing extremely well with CPAP appliance with replenishing sleep as well as much improvement in his A1c and also a 60 pound plus weight loss since December 2023 who is here today to have all of his preoperative labs and EKG and x-rays reviewed for medical clearance for his upcoming bariatric surgery on July 24, 2024, specifically a duodenal switch.    All labs reviewed and unremarkable.  EKG is normal.  Chest x-ray is normal.  Complete metabolic profile and coagulation tests are normal.  Patient does have a borderline high white blood cell count which has been seen before in previous lab tests as early as January 2024.  These tests were done on July 5, 2024 and the patient does not report any symptoms consistent with upper respiratory infection or any any other febrile causing symptoms.    After review of all of the completed tests and the patient's current healthy status and excellent A1c, the patient is officially medically cleared to endure the proposed duodenal switch surgery on July 24, 2024.            History/Other:   Review of Systems  Current Outpatient Medications   Medication Sig Dispense Refill    Tirzepatide (MOUNJARO) 7.5 MG/0.5ML Subcutaneous Solution Pen-injector Inject 7.5 mg into the skin every 7 days. 6 mL 0    atorvastatin 20 MG Oral Tab Take 1 tablet (20 mg total) by mouth nightly. 90 tablet 0    metFORMIN  MG Oral Tablet 24 Hr Take 2 tablets (1,000 mg total) by mouth 2 (two) times daily with meals. 360 tablet 1    Testosterone (ANDROGEL) 40.5 MG/2.5GM (1.62%) Transdermal Gel Place 1 Application  onto the skin daily. ICD-9: 257.2 90 each 0    Testosterone (ANDROGEL) 40.5 MG/2.5GM (1.62%) Transdermal Gel Place 1 Application onto the skin daily. 90 each 0    Continuous Blood Gluc Sensor (DEXCOM G7  SENSOR) Does not apply Misc 1 each Every 10 days. Use as directed every 10 days 9 each 0    Blood Glucose Monitoring Suppl (ONETOUCH VERIO) w/Device Does not apply Kit 1 each As Directed. Use to monitor blood sugar levels 1x daily 1 kit 0    Glucose Blood (ONETOUCH VERIO) In Vitro Strip Use to check blood sugar 1x daily 100 strip 0    OneTouch Delica Lancets 33G Does not apply Misc Use to check blood sugar 1x daily 100 each 0    Continuous Blood Gluc Sensor (FREESTYLE DAKSHA 3 SENSOR) Does not apply Misc 1 each every 14 (fourteen) days. 6 each 1     Allergies:No Known Allergies    No past medical history on file.   No past surgical history on file.   No family history on file.   Social History:   Social History     Socioeconomic History    Marital status:    Tobacco Use    Smoking status: Some Days     Types: Cigars, Cigarettes    Smokeless tobacco: Never   Vaping Use    Vaping status: Never Used   Substance and Sexual Activity    Alcohol use: Yes    Drug use: Never    Sexual activity: Not Currently        Objective:   Vitals:    07/09/24 1634   BP: 112/84   Pulse:    Temp:        Physical Exam  Constitutional:       General: He is not in acute distress.     Appearance: He is obese. He is not ill-appearing.   HENT:      Head: Normocephalic and atraumatic.      Right Ear: Tympanic membrane normal.      Left Ear: Tympanic membrane normal.      Nose: Nose normal.      Mouth/Throat:      Mouth: Mucous membranes are moist.   Neck:      Thyroid: No thyromegaly.   Cardiovascular:      Rate and Rhythm: Normal rate.      Heart sounds:      No gallop.   Pulmonary:      Effort: Pulmonary effort is normal.      Breath sounds: Normal breath sounds.   Neurological:      Mental Status: He is alert.   Psychiatric:         Mood and Affect: Mood normal.         Assessment & Plan:   1. Pre-op examination  Patient is medically cleared to proceed bariatric surgery.  Duodenal switch to be performed on July 24, 2024.    2. Class 3  severe obesity with serious comorbidity and body mass index (BMI) of 50.0 to 59.9 in adult, unspecified obesity type (HCC)  Status is much improved.  Patient's BMI has decreased by approximately 10 points.  Continue to work with weight loss team.    3. ALAN on CPAP  Clinically much improved.  Patient compliant with CPAP appliance.    4. Type 2 diabetes mellitus without complication, without long-term current use of insulin (HCC)  A1c is now below the prediabetic range.  This is excellent.      No orders of the defined types were placed in this encounter.      Meds This Visit:  Requested Prescriptions      No prescriptions requested or ordered in this encounter       Imaging & Referrals:  None     Patient Instructions   All labs and EKG and chest x-ray results reviewed.  Patient medically cleared for proposed duodenal switch bariatric surgery on July 24, 2024.  Clearance form has been completed so that it can be returned with today's clearance evaluation attached.  Patient has been instructed to discontinue his testosterone gel as well.    Return if symptoms worsen or fail to improve.

## 2024-07-10 ENCOUNTER — TELEPHONE (OUTPATIENT)
Dept: FAMILY MEDICINE CLINIC | Facility: CLINIC | Age: 44
End: 2024-07-10

## 2024-07-25 ENCOUNTER — TELEPHONE (OUTPATIENT)
Dept: ENDOCRINOLOGY CLINIC | Facility: CLINIC | Age: 44
End: 2024-07-25

## 2024-07-25 NOTE — TELEPHONE ENCOUNTER
Patient states that he had Gastric surgery (Duodenal Switch) yesterday and was discharged today.  He was informed that he can not take Metformin ER because the ER should not be crushed. Patient is requesting medication in liquid form or a form that can be crushed.  Patient is requesting prescription to be sent to [facility].  Please call

## 2024-07-26 NOTE — TELEPHONE ENCOUNTER
Left voice message for pt to call back. [facility] sent with medication instructions as written below by Dr. Reis and advised pt to make follow up appointment.

## 2024-07-29 ENCOUNTER — TELEPHONE (OUTPATIENT)
Dept: FAMILY MEDICINE CLINIC | Facility: CLINIC | Age: 44
End: 2024-07-29

## 2024-07-29 NOTE — TELEPHONE ENCOUNTER
Patient states that he had surgery on 7-24-24 and was told to follow up with his pcp in one week. The first available appointment with Dr. Hanson is not until after October. Patient is willing to see anyone else in [place]. The first available appointment with any provider in [place] is not until 8-29-24. Patient would like to know if he can be seen sooner. Patient, states that he is off work this week and is willing to see any provider this week at anytime.  Would the doctor like to add the patient to the schedule? If so which date and time?    Please reply to pool: EM [facility] RHE [[phone]]

## 2024-07-30 NOTE — TELEPHONE ENCOUNTER
Patient returned our call ( Identified name and date of birth )     Advised needs to make an appointment and will be  a wait as being added to the schedule     Future Appointments   Date Time Provider Department Center   8/5/2024  5:20 PM Zion Hanson [facility]   9/20/2024  7:45 AM [facility]

## 2024-08-05 ENCOUNTER — OFFICE VISIT (OUTPATIENT)
Dept: FAMILY MEDICINE CLINIC | Facility: CLINIC | Age: 44
End: 2024-08-05

## 2024-08-05 VITALS
DIASTOLIC BLOOD PRESSURE: 77 MMHG | WEIGHT: 315 LBS | RESPIRATION RATE: 18 BRPM | OXYGEN SATURATION: 97 % | TEMPERATURE: 98 F | HEART RATE: 60 BPM | SYSTOLIC BLOOD PRESSURE: 110 MMHG | BODY MASS INDEX: 48 KG/M2

## 2024-08-05 DIAGNOSIS — Z28.21 PNEUMOCOCCAL VACCINATION DECLINED BY PATIENT: Primary | ICD-10-CM

## 2024-08-05 DIAGNOSIS — Z90.3 S/P GASTRIC SLEEVE PROCEDURE: ICD-10-CM

## 2024-08-05 DIAGNOSIS — E11.9 TYPE 2 DIABETES MELLITUS WITHOUT COMPLICATION, WITHOUT LONG-TERM CURRENT USE OF INSULIN (HCC): ICD-10-CM

## 2024-08-05 DIAGNOSIS — G47.33 OSA ON CPAP: ICD-10-CM

## 2024-08-05 DIAGNOSIS — E66.01 CLASS 3 SEVERE OBESITY WITH SERIOUS COMORBIDITY AND BODY MASS INDEX (BMI) OF 45.0 TO 49.9 IN ADULT, UNSPECIFIED OBESITY TYPE (HCC): ICD-10-CM

## 2024-08-05 PROCEDURE — 99214 OFFICE O/P EST MOD 30 MIN: CPT | Performed by: FAMILY MEDICINE

## 2024-08-05 PROCEDURE — 3074F SYST BP LT 130 MM HG: CPT | Performed by: FAMILY MEDICINE

## 2024-08-05 PROCEDURE — 3078F DIAST BP <80 MM HG: CPT | Performed by: FAMILY MEDICINE

## 2024-08-05 RX ORDER — OMEPRAZOLE 40 MG/1
CAPSULE, DELAYED RELEASE ORAL
COMMUNITY
Start: 2024-07-22

## 2024-08-05 NOTE — PATIENT INSTRUCTIONS
Recommend weight loss via daily exercising and consistent healthy dietary changes.  Encouraged physical fitness and daily physical activity daily.  Monitor blood pressures and record at home. Limit salt intake.  Comply with medications.  Keep specialist's follow up appointment.

## 2024-08-11 NOTE — PROGRESS NOTES
Subjective:     Patient ID: Ricardo Wilkins is a 44 year old male.    This patient is a 44-year-old morbidly obese by definition and well-controlled diabetic gentleman who is known to follow-up after having gastric sleeve procedure and continues to use weight at a very steady and good pace.  Patient is able to consume food and eliminate normally.  Patient is following up with his weight management team.    Patient denies headaches, dizziness, shortness of breath, chest pain, acute visual changes, exertional fatigue, excessive hunger and thirst, and/or urinary frequency.    We have given recommendation for early Prevnar vaccine, but patient declines for now.            History/Other:   Review of Systems  Current Outpatient Medications   Medication Sig Dispense Refill    Omeprazole 40 MG Oral Capsule Delayed Release 1 capsule (open capsule) Orally Once a day for 90 days      Testosterone (ANDROGEL) 40.5 MG/2.5GM (1.62%) Transdermal Gel Place 1 Application  onto the skin daily. ICD-9: 257.2 90 each 0    Testosterone (ANDROGEL) 40.5 MG/2.5GM (1.62%) Transdermal Gel Place 1 Application onto the skin daily. 90 each 0    metFORMIN  MG Oral Tablet 24 Hr Take 2 tablets (1,000 mg total) by mouth 2 (two) times daily with meals. (Patient not taking: Reported on 8/5/2024) 360 tablet 1    Continuous Blood Gluc Sensor (DEXCOM G7 SENSOR) Does not apply Misc 1 each Every 10 days. Use as directed every 10 days (Patient not taking: Reported on 8/5/2024) 9 each 0    Blood Glucose Monitoring Suppl (ONETOUCH VERIO) w/Device Does not apply Kit 1 each As Directed. Use to monitor blood sugar levels 1x daily (Patient not taking: Reported on 8/5/2024) 1 kit 0    Glucose Blood (ONETOUCH VERIO) In Vitro Strip Use to check blood sugar 1x daily (Patient not taking: Reported on 8/5/2024) 100 strip 0    OneTouch Delica Lancets 33G Does not apply Misc Use to check blood sugar 1x daily (Patient not taking: Reported on 8/5/2024) 100 each 0     Continuous Blood Gluc Sensor (FREESTYLE DAKSHA 3 SENSOR) Does not apply Misc 1 each every 14 (fourteen) days. 6 each 1     Allergies:No Known Allergies    History reviewed. No pertinent past medical history.   History reviewed. No pertinent surgical history.   History reviewed. No pertinent family history.   Social History:   Social History     Socioeconomic History    Marital status:    Tobacco Use    Smoking status: Some Days     Types: Cigars, Cigarettes    Smokeless tobacco: Never   Vaping Use    Vaping status: Never Used   Substance and Sexual Activity    Alcohol use: Yes    Drug use: Never    Sexual activity: Not Currently        Objective:   Vitals:    08/05/24 1754   BP: 110/77   Pulse:    Resp:    Temp:        Physical Exam  Constitutional:       General: He is not in acute distress.     Appearance: He is obese. He is not ill-appearing.   HENT:      Head: Normocephalic and atraumatic.      Right Ear: Tympanic membrane normal.      Left Ear: Tympanic membrane normal.      Nose: Nose normal.      Mouth/Throat:      Mouth: Mucous membranes are moist.   Neck:      Thyroid: No thyromegaly.   Cardiovascular:      Rate and Rhythm: Normal rate and regular rhythm.      Heart sounds:      No gallop.   Pulmonary:      Effort: Pulmonary effort is normal.      Breath sounds: Normal breath sounds.   Neurological:      Mental Status: He is alert.   Psychiatric:         Mood and Affect: Mood normal.         Assessment & Plan:   1. S/P gastric sleeve procedure  Patient's weight status has improved.  Continue with dietary disciplines.  Keep appointments with weight .    2. Class 3 severe obesity with serious comorbidity and body mass index (BMI) of 45.0 to 49.9 in adult, unspecified obesity type (HCC)  See #1.    3. Type 2 diabetes mellitus without complication, without long-term current use of insulin (HCC)  Patient's last A1c from May 29, 2024 was at 5.6.  This is great.    4. ALAN on  CPAP  Compliance with CPAP appliance and apnea specialist consultations as needed.    5. Pneumococcal vaccination declined by patient  Declined by the patient for now.  - Prevnar 20 (PCV20) [28236]      Orders Placed This Encounter   Procedures    Prevnar 20 (PCV20) [18512]       Meds This Visit:  Requested Prescriptions      No prescriptions requested or ordered in this encounter       Imaging & Referrals:  PCV20 VACCINE FOR INTRAMUSCULAR USE     Patient Instructions   Recommend weight loss via daily exercising and consistent healthy dietary changes.  Encouraged physical fitness and daily physical activity daily.  Monitor blood pressures and record at home. Limit salt intake.  Comply with medications.  Keep specialist's follow up appointment.    Return if symptoms worsen or fail to improve.

## 2024-09-20 ENCOUNTER — OFFICE VISIT (OUTPATIENT)
Facility: CLINIC | Age: 44
End: 2024-09-20

## 2024-09-20 VITALS
DIASTOLIC BLOOD PRESSURE: 78 MMHG | HEART RATE: 68 BPM | WEIGHT: 310 LBS | BODY MASS INDEX: 43.4 KG/M2 | SYSTOLIC BLOOD PRESSURE: 118 MMHG | HEIGHT: 71 IN

## 2024-09-20 DIAGNOSIS — E11.65 HYPERGLYCEMIA DUE TO DIABETES MELLITUS (HCC): ICD-10-CM

## 2024-09-20 DIAGNOSIS — E66.01 CLASS 3 SEVERE OBESITY WITH BODY MASS INDEX (BMI) OF 40.0 TO 44.9 IN ADULT, UNSPECIFIED OBESITY TYPE, UNSPECIFIED WHETHER SERIOUS COMORBIDITY PRESENT (HCC): ICD-10-CM

## 2024-09-20 DIAGNOSIS — E78.5 DYSLIPIDEMIA: ICD-10-CM

## 2024-09-20 DIAGNOSIS — L83 ACANTHOSIS: ICD-10-CM

## 2024-09-20 DIAGNOSIS — R74.01 HIGH LIVER TRANSAMINASE LEVEL: ICD-10-CM

## 2024-09-20 DIAGNOSIS — Z98.84 BARIATRIC SURGERY STATUS: ICD-10-CM

## 2024-09-20 DIAGNOSIS — E11.65 UNCONTROLLED TYPE 2 DIABETES MELLITUS WITH HYPERGLYCEMIA (HCC): Primary | ICD-10-CM

## 2024-09-20 DIAGNOSIS — K76.0 FATTY LIVER: ICD-10-CM

## 2024-09-20 LAB
GLUCOSE BLOOD: 90
HEMOGLOBIN A1C: 5.2 % (ref 4.3–5.6)
TEST STRIP LOT #: NORMAL NUMERIC

## 2024-09-20 PROCEDURE — 3008F BODY MASS INDEX DOCD: CPT | Performed by: INTERNAL MEDICINE

## 2024-09-20 PROCEDURE — 99214 OFFICE O/P EST MOD 30 MIN: CPT | Performed by: INTERNAL MEDICINE

## 2024-09-20 PROCEDURE — 82947 ASSAY GLUCOSE BLOOD QUANT: CPT | Performed by: INTERNAL MEDICINE

## 2024-09-20 PROCEDURE — 3044F HG A1C LEVEL LT 7.0%: CPT | Performed by: INTERNAL MEDICINE

## 2024-09-20 PROCEDURE — 3074F SYST BP LT 130 MM HG: CPT | Performed by: INTERNAL MEDICINE

## 2024-09-20 PROCEDURE — 3078F DIAST BP <80 MM HG: CPT | Performed by: INTERNAL MEDICINE

## 2024-09-20 PROCEDURE — 83036 HEMOGLOBIN GLYCOSYLATED A1C: CPT | Performed by: INTERNAL MEDICINE

## 2024-09-20 NOTE — PROGRESS NOTES
Reason for Visit:   DM  Requesting Physician:   Suma Hanson DO  CHIEF COMPLAINT:    Chief Complaint   Patient presents with    Diabetes     Pt is in for a Diabetes follow up. A1c check      HISTORY OF PRESENT ILLNESS:   Ricardo Wilkins is a 44 year old male who presents with  DM   DLP, ALAN, fatty liver, morbid obesity, and insulin resistance s/p Bariatric surgery 7/24/2024  He stopped DM meds   Not on statin now     Lost wt and happy with result   Wt Readings from Last 6 Encounters:   09/20/24 (!) 310 lb (140.6 kg)   08/05/24 (!) 347 lb (157.4 kg)   07/09/24 (!) 377 lb (171 kg)   04/26/24 (!) 409 lb (185.5 kg)   03/08/24 (!) 430 lb (195 kg)   03/04/24 (!) 439 lb 9.6 oz (199.4 kg)       ALAN  using CPAP     In 2023 started testosterone gel     Fatty liver with high LFTs        DM HISTORY  Diagnosed: Feb 2024      CURRENT DIABETIC MEDICATIONS INCLUDE:  Not on meds now stopped MTF 1000 mg bid and mounjaro        Lab Results   Component Value Date    A1C 5.6 05/29/2024    A1C 7.5 (H) 02/06/2024      DM quality measures:  A1C/Blood pressure: as reported above   Last dilated eye exam: No data recorded   Exam shows retinopathy? No data recorded  Last diabetic foot exam: Last Foot Exam: 08/05/24    Dentist : recommend every 6m  Nephropathy screening:   ace /arb rx:     LIPID screening: Cholesterol Meds:      Cholesterol: 219, done on 1/3/2024.  HDL Cholesterol: 43, done on 1/3/2024.  TriGlycerides 163, done on 1/3/2024.  LDL Cholesterol: 146, done on 1/3/2024.          ASSESSMENT AND PLAN:  Ricardo Wilkins is a 44 year old  male who presents with  DM, DLP, ALAN, fatty liver, morbid obesity, and insulin resistance s/p Bariatric surgery 7/24/2024     Discussed long term follow up and risk of complications like vitamin def and osteoporosis.   D/w tx options and side effect    Happy with the result         Labs showed A1c 5.2 on 9/20/2024     Plan  D3 2000 unit daily   Omeprazole   bariatric multivitamin  Using CPAP now        Follow up with PCP   Follow up with endocrine PRN          PAST MEDICAL HISTORY:   History reviewed. No pertinent past medical history.  DM  DLP  Low testosterone   Fatty liver     PAST SURGICAL HISTORY:   History reviewed. No pertinent surgical history.    CURRENT MEDICATIONS:    No outpatient medications have been marked as taking for the 9/20/24 encounter (Office Visit) with Randy Reis MD.       ALLERGIES:  No Known Allergies    SOCIAL HISTORY:    Social History     Socioeconomic History    Marital status:    Tobacco Use    Smoking status: Some Days     Types: Cigars, Cigarettes    Smokeless tobacco: Never   Vaping Use    Vaping status: Never Used   Substance and Sexual Activity    Alcohol use: Yes    Drug use: Never    Sexual activity: Not Currently          FAMILY HISTORY:   History reviewed. No pertinent family history.     FHX GF with DM    PHYSICAL EXAM:   Height: 5' 11\" (180.3 cm) (09/20 0802)  Weight: 310 lb (140.6 kg) (09/20 0802)  BSA (Calculated - sq m): 2.54 sq meters (09/20 0802)  Pulse: 68 (09/20 0802)  BP: 118/78 (09/20 0802)  Temp: --  Do Not Use - Resp Rate: --  SpO2: --    Body mass index is 43.24 kg/m².  Wt Readings from Last 6 Encounters:   09/20/24 (!) 310 lb (140.6 kg)   08/05/24 (!) 347 lb (157.4 kg)   07/09/24 (!) 377 lb (171 kg)   04/26/24 (!) 409 lb (185.5 kg)   03/08/24 (!) 430 lb (195 kg)   03/04/24 (!) 439 lb 9.6 oz (199.4 kg)       CONSTITUTIONAL:  Awake and alert. Age appropriate, good hygiene not in acute distress. Well-nourished and well developed. no acute distress   PSYCH:   Orientated to time, place, person & situation, Normal mood and affect, memory intact, normal insight and judgment, cooperative  Neuro: speech is clear. Awake, alert, no aphasia, no facial asymmetry, no nuchal rigidity  EYES:  No proptosis, no ptosis, conjunctiva normal  ENT:  Normocephalic, atraumatic     DATA:     Pertinent data reviewed     Lab Results   Component Value Date    A1C 5.6  05/29/2024    A1C 7.5 (H) 02/06/2024      Cholesterol: 219, done on 1/3/2024.  HDL Cholesterol: 43, done on 1/3/2024.  LDL Cholesterol: 146, done on 1/3/2024.  TriGlycerides 163, done on 1/3/2024.     04/23/24 07:45   CREATININE UR RANDOM mg/dL 237.70   MALB URINE mg/dL 0.80   MALB/CRE CALC <=30.0 ug/mg 3.4              No results for input(s): \"TSH\", \"T4F\", \"T3F\", \"THYP\" in the last 72 hours.  No results found.    Orders Placed This Encounter   Procedures    POC HemoCue Glucose 201 (Finger stick glucose)    POC Glycohemoglobin [02905]     Orders Placed This Encounter    POC HemoCue Glucose 201 (Finger stick glucose)     Order Specific Question:   Release to patient     Answer:   Immediate    POC Glycohemoglobin [[id number]]     Order Specific Question:   Release to patient     Answer:   Immediate      TSH   Date Value Ref Range Status   02/06/2024 2.861 0.550 - 4.780 mIU/mL Final        This is a specialized patient consultation in endocrinology and required comprehensive review of prior records, as well as current evaluation, with time required for consideration of complex endocrine issues and consultation. For this visit, I personally interviewed the patient, and family member if accompanied, performed the pertinent parts of the history and physical examination. ROS included screening for appropriate endocrine conditions.   Today's diagnosis and plan were reviewed in detail with the patient who states understanding and agrees with plan. I discussed with the patient possible diagnosis, differential diagnosis, need for work up, treatment options, alternatives and side effects.     Please see note for details about time spent which includes:   · pre-visit preparation  · reviewing records  · face to face time with the patient   · timely documentation of the encounter  · ordering medications/tests  · communication with care team  · care coordination    I appreciate the opportunity to be part of your patient's medical care  and will keep you, as the referring and primary physicians, informed about the care of your patient. Please feel free to contact me should you have any questions.      The 21st Century Cures Act makes medical notes like these available to patients in the interest of transparency. Please be advised this is a medical document. Medical documents are intended to carry relevant information, facts as evident, and the clinical opinion of the practitioner. The medical note is intended as peer to peer communication and may appear blunt or direct. It is written in medical language and may contain abbreviations or verbiage that are unfamiliar.     Randy Reis MD

## 2024-10-02 DIAGNOSIS — E34.9 HYPOTESTOSTERONISM: ICD-10-CM

## 2024-10-02 NOTE — TELEPHONE ENCOUNTER
Patient called requesting a refill on the following medication:    Testosterone (ANDROGEL) 40.5 MG/2.5GM (1.62%) Transdermal Gel

## 2024-10-04 RX ORDER — TESTOSTERONE 40.5 MG/2.5G
1 GEL TOPICAL DAILY
Qty: 90 EACH | Refills: 0 | Status: SHIPPED | OUTPATIENT
Start: 2024-10-04

## 2024-10-04 NOTE — TELEPHONE ENCOUNTER
Please review. Protocol Failed; No Protocol      Recent fills: 5/17/2024  Last Rx written: 2/20/2024  Last office visit: 8/5/2024          Requested Prescriptions   Pending Prescriptions Disp Refills    Testosterone (ANDROGEL) 40.5 MG/2.5GM (1.62%) Transdermal Gel 90 each 0     Sig: Place 1 Application  onto the skin daily. ICD-9: 257.2       Controlled Substance Medication Failed - 10/2/2024  3:59 PM        Failed - This medication is a controlled substance - forward to provider to refill                 Recent Outpatient Visits              2 weeks ago Uncontrolled type 2 diabetes mellitus with hyperglycemia (HCC)    [facility], Randy Coyne MD    Office Visit    2 months ago Pneumococcal vaccination declined by patient    [facility], [place],     Office Visit    2 months ago Pre-op examination    [facility], [place],     Office Visit    5 months ago Uncontrolled type 2 diabetes mellitus with hyperglycemia (HCC)    [facility], Randy Coyne MD    Office Visit    6 months ago Uncontrolled type 2 diabetes mellitus with hyperglycemia (HCC)    [facility], [place]    Nurse Only

## 2025-01-20 DIAGNOSIS — E34.9 HYPOTESTOSTERONISM: ICD-10-CM

## 2025-01-24 NOTE — TELEPHONE ENCOUNTER
Please review; protocol failed/No Protocol    Recent Fills: 10/04/2024    Last Rx Written: 10/04/2024    Last Office Visit: 08/05/2024    Requested Prescriptions   Pending Prescriptions Disp Refills    TESTOSTERONE 40.5 MG/2.5GM (1.62%) Transdermal Gel [Pharmacy Med Name: MALENA LATASHA 40.5MG] 225 g 0     Sig: APPLY THE CONTENTS OF 1    PACKET TRANSDERMALLY ONTO  THE SKIN DAILY       Controlled Substance Medication Failed - 1/24/2025  2:08 PM        Failed - This medication is a controlled substance - forward to provider to refill        Passed - Medication is active on med list             Recent Outpatient Visits              4 months ago Uncontrolled type 2 diabetes mellitus with hyperglycemia (HCC)    [facility], Randy Coyne MD    Office Visit    5 months ago Pneumococcal vaccination declined by patient    [facility] [place]    Office Visit    6 months ago Pre-op examination    [facility] [place]     Office Visit    9 months ago Uncontrolled type 2 diabetes mellitus with hyperglycemia (HCC)    [facility], Randy Coyne MD    Office Visit    10 months ago Uncontrolled type 2 diabetes mellitus with hyperglycemia ([facility])    [facility], [place]    Nurse Only

## 2025-01-26 RX ORDER — TESTOSTERONE 40.5 MG/2.5G
1 GEL TOPICAL DAILY
Qty: 225 G | Refills: 1 | Status: SHIPPED | OUTPATIENT
Start: 2025-01-26

## 2025-06-09 ENCOUNTER — OFFICE VISIT (OUTPATIENT)
Dept: FAMILY MEDICINE CLINIC | Facility: CLINIC | Age: 45
End: 2025-06-09

## 2025-06-09 VITALS
TEMPERATURE: 98 F | HEIGHT: 71 IN | HEART RATE: 55 BPM | OXYGEN SATURATION: 97 % | RESPIRATION RATE: 18 BRPM | DIASTOLIC BLOOD PRESSURE: 74 MMHG | WEIGHT: 184 LBS | BODY MASS INDEX: 25.76 KG/M2 | SYSTOLIC BLOOD PRESSURE: 106 MMHG

## 2025-06-09 DIAGNOSIS — G47.33 OSA (OBSTRUCTIVE SLEEP APNEA): ICD-10-CM

## 2025-06-09 DIAGNOSIS — R68.82 DECREASED LIBIDO: ICD-10-CM

## 2025-06-09 DIAGNOSIS — Z23 NEED FOR PNEUMOCOCCAL VACCINATION: ICD-10-CM

## 2025-06-09 DIAGNOSIS — E11.9 TYPE 2 DIABETES MELLITUS WITHOUT COMPLICATION, WITHOUT LONG-TERM CURRENT USE OF INSULIN (HCC): ICD-10-CM

## 2025-06-09 DIAGNOSIS — Z00.00 ROUTINE PHYSICAL EXAMINATION: Primary | ICD-10-CM

## 2025-06-09 LAB
ALBUMIN SERPL-MCNC: 3.7 G/DL (ref 3.2–4.8)
ALBUMIN/GLOB SERPL: 1.5 {RATIO} (ref 1–2)
ALP LIVER SERPL-CCNC: 88 U/L (ref 45–117)
ALT SERPL-CCNC: 29 U/L (ref 10–49)
ANION GAP SERPL CALC-SCNC: 6 MMOL/L (ref 0–18)
AST SERPL-CCNC: 36 U/L (ref ?–34)
BASOPHILS # BLD AUTO: 0.1 X10(3) UL (ref 0–0.2)
BASOPHILS NFR BLD AUTO: 1.4 %
BILIRUB SERPL-MCNC: 0.7 MG/DL (ref 0.3–1.2)
BILIRUB UR QL: NEGATIVE
BUN BLD-MCNC: 11 MG/DL (ref 9–23)
BUN/CREAT SERPL: 15.9 (ref 10–20)
CALCIUM BLD-MCNC: 9.1 MG/DL (ref 8.7–10.4)
CHLORIDE SERPL-SCNC: 107 MMOL/L (ref 98–112)
CHOLEST SERPL-MCNC: 134 MG/DL (ref ?–200)
CLARITY UR: CLEAR
CO2 SERPL-SCNC: 30 MMOL/L (ref 21–32)
COLOR UR: YELLOW
CREAT BLD-MCNC: 0.69 MG/DL (ref 0.7–1.3)
CREAT UR-SCNC: 213.3 MG/DL
DEPRECATED RDW RBC AUTO: 45.1 FL (ref 35.1–46.3)
EGFRCR SERPLBLD CKD-EPI 2021: 118 ML/MIN/1.73M2 (ref 60–?)
EOSINOPHIL # BLD AUTO: 0.14 X10(3) UL (ref 0–0.7)
EOSINOPHIL NFR BLD AUTO: 2 %
ERYTHROCYTE [DISTWIDTH] IN BLOOD BY AUTOMATED COUNT: 14 % (ref 11–15)
EST. AVERAGE GLUCOSE BLD GHB EST-MCNC: 91 MG/DL (ref 68–126)
FASTING PATIENT LIPID ANSWER: NO
FASTING STATUS PATIENT QL REPORTED: NO
GLOBULIN PLAS-MCNC: 2.4 G/DL (ref 2–3.5)
GLUCOSE BLD-MCNC: 88 MG/DL (ref 70–99)
GLUCOSE UR-MCNC: NORMAL MG/DL
HBA1C MFR BLD: 4.8 % (ref ?–5.7)
HCT VFR BLD AUTO: 39.6 % (ref 39–53)
HDLC SERPL-MCNC: 42 MG/DL (ref 40–59)
HGB BLD-MCNC: 12.8 G/DL (ref 13–17.5)
HGB UR QL STRIP.AUTO: NEGATIVE
IMM GRANULOCYTES # BLD AUTO: 0.01 X10(3) UL (ref 0–1)
IMM GRANULOCYTES NFR BLD: 0.1 %
KETONES UR-MCNC: NEGATIVE MG/DL
LDLC SERPL CALC-MCNC: 80 MG/DL (ref ?–100)
LEUKOCYTE ESTERASE UR QL STRIP.AUTO: 25
LYMPHOCYTES # BLD AUTO: 2.79 X10(3) UL (ref 1–4)
LYMPHOCYTES NFR BLD AUTO: 39.5 %
MCH RBC QN AUTO: 28.2 PG (ref 26–34)
MCHC RBC AUTO-ENTMCNC: 32.3 G/DL (ref 31–37)
MCV RBC AUTO: 87.2 FL (ref 80–100)
MICROALBUMIN UR-MCNC: 0.6 MG/DL
MICROALBUMIN/CREAT 24H UR-RTO: 2.8 UG/MG (ref ?–30)
MONOCYTES # BLD AUTO: 0.41 X10(3) UL (ref 0.1–1)
MONOCYTES NFR BLD AUTO: 5.8 %
NEUTROPHILS # BLD AUTO: 3.61 X10 (3) UL (ref 1.5–7.7)
NEUTROPHILS # BLD AUTO: 3.61 X10(3) UL (ref 1.5–7.7)
NEUTROPHILS NFR BLD AUTO: 51.2 %
NITRITE UR QL STRIP.AUTO: NEGATIVE
NONHDLC SERPL-MCNC: 92 MG/DL (ref ?–130)
OSMOLALITY SERPL CALC.SUM OF ELEC: 295 MOSM/KG (ref 275–295)
PH UR: 5.5 [PH] (ref 5–8)
PLATELET # BLD AUTO: 220 10(3)UL (ref 150–450)
POTASSIUM SERPL-SCNC: 4.7 MMOL/L (ref 3.5–5.1)
PROT SERPL-MCNC: 6.1 G/DL (ref 5.7–8.2)
PROT UR-MCNC: NEGATIVE MG/DL
RBC # BLD AUTO: 4.54 X10(6)UL (ref 4.3–5.7)
SODIUM SERPL-SCNC: 143 MMOL/L (ref 136–145)
SP GR UR STRIP: 1.02 (ref 1–1.03)
TRIGL SERPL-MCNC: 58 MG/DL (ref 30–149)
TSI SER-ACNC: 1.07 UIU/ML (ref 0.55–4.78)
UROBILINOGEN UR STRIP-ACNC: 2
VLDLC SERPL CALC-MCNC: 9 MG/DL (ref 0–30)
WBC # BLD AUTO: 7.1 X10(3) UL (ref 4–11)

## 2025-06-09 PROCEDURE — 99396 PREV VISIT EST AGE 40-64: CPT | Performed by: FAMILY MEDICINE

## 2025-06-09 PROCEDURE — 3074F SYST BP LT 130 MM HG: CPT | Performed by: FAMILY MEDICINE

## 2025-06-09 PROCEDURE — 3044F HG A1C LEVEL LT 7.0%: CPT | Performed by: FAMILY MEDICINE

## 2025-06-09 PROCEDURE — 3008F BODY MASS INDEX DOCD: CPT | Performed by: FAMILY MEDICINE

## 2025-06-09 PROCEDURE — 3078F DIAST BP <80 MM HG: CPT | Performed by: FAMILY MEDICINE

## 2025-06-09 PROCEDURE — 3061F NEG MICROALBUMINURIA REV: CPT | Performed by: FAMILY MEDICINE

## 2025-06-10 ENCOUNTER — TELEPHONE (OUTPATIENT)
Dept: SLEEP CENTER | Age: 45
End: 2025-06-10

## 2025-06-14 ENCOUNTER — PATIENT MESSAGE (OUTPATIENT)
Dept: FAMILY MEDICINE CLINIC | Facility: CLINIC | Age: 45
End: 2025-06-14

## 2025-06-14 DIAGNOSIS — D64.9 MILD ANEMIA: ICD-10-CM

## 2025-06-14 DIAGNOSIS — R82.90 ABNORMAL URINALYSIS: ICD-10-CM

## 2025-06-14 DIAGNOSIS — D64.9 ANEMIA, UNSPECIFIED TYPE: Primary | ICD-10-CM

## 2025-06-15 NOTE — PROGRESS NOTES
Subjective:     Patient ID: Ricardo Wilkins is a 44 year old male.    This patient is a 44-year-old male here for complete preventive care physical and for status update on any confirmed chronic medical illnesses and follow up on any previous labs or procedures that were suggestive or in need of further work up. Bowel, bladder, and sexual function are intact.    Substantial weight loss secondary to gastrointestinal procedure.    Patient's apneic activity has decreased as witnessed by spouse.    We need a status update regarding the patient's diabetes.    Patient currently denies headaches, chest pain, dizziness, shortness of breath, acute visual changes, and/or exertional fatigue.    Patient denies urinary frequency or excessive thirst and no visual disturbance.    Patient consents to have Prevnar vaccine done.    We will retrieve the patient's external ophthalmology evaluation so that his chart reflects that he is current regarding this evaluation.          History/Other:   Review of Systems  Current Medications[1]  Allergies:Allergies[2]    Past Medical History[3]   Past Surgical History[4]   Family History[5]   Social History: Short Social Hx on File[6]     Objective:   Vitals:    06/09/25 1758   BP: 106/74   Pulse:    Resp:    Temp:        Physical Exam  Constitutional:       General: He is not in acute distress.     Appearance: Normal appearance. He is not ill-appearing.   HENT:      Right Ear: Tympanic membrane normal.      Left Ear: Tympanic membrane normal.      Nose: Nose normal.      Mouth/Throat:      Mouth: Mucous membranes are moist.   Neck:      Thyroid: No thyromegaly.   Cardiovascular:      Rate and Rhythm: Normal rate and regular rhythm.      Heart sounds: Normal heart sounds.   Pulmonary:      Effort: Pulmonary effort is normal.      Breath sounds: Normal breath sounds.   Abdominal:      General: Bowel sounds are normal.      Palpations: Abdomen is soft.      Tenderness: There is no abdominal  tenderness.   Neurological:      General: No focal deficit present.      Mental Status: He is alert and oriented to person, place, and time.   Psychiatric:         Mood and Affect: Mood normal.         Assessment & Plan:   1. Routine physical examination  Well exam and the following labs have been ordered.  - CBC W Differential W Platelet [E]; Future  - Comp Metabolic Panel (14) [E]; Future  - Lipid Panel [E]; Future  - TSH W Reflex To Free T4 [E]; Future  - Urinalysis, Routine [E]; Future  - CBC W Differential W Platelet [E]  - Comp Metabolic Panel (14) [E]  - Lipid Panel [E]  - TSH W Reflex To Free T4 [E]  - Urinalysis, Routine [E]    2. Type 2 diabetes mellitus without complication, without long-term current use of insulin ([facility])  Status update regarding diabetes.  - Microalb/Creat Ratio, Random Urine [E]; Future  - Hemoglobin A1C [E]; Future  - Microalb/Creat Ratio, Random Urine [E]  - Hemoglobin A1C [E]    3. Need for pneumococcal vaccination  Ordered and administered on today.  - Prevnar 20 (PCV20) [35404]    4. ALAN (obstructive sleep apnea)  Patient now needs his settings recheck on account of his major weight loss.  Patient would likely need to decrease if not discontinuation of CPAP.  - CPAP RE-TITRATION STUDY  - General sleep study; Future    5. Decreased libido  Status has improved since patient has lost a significant amount of weight.      Orders Placed This Encounter   Procedures    Microalb/Creat Ratio, Random Urine [E]    CBC W Differential W Platelet [E]    Comp Metabolic Panel (14) [E]    Lipid Panel [E]    TSH W Reflex To Free T4 [E]    Urinalysis, Routine [E]    Hemoglobin A1C [E]    Prevnar 20 (PCV20) [18802]       Meds This Visit:  Requested Prescriptions      No prescriptions requested or ordered in this encounter       Imaging & Referrals:  PCV20 VACCINE FOR INTRAMUSCULAR USE  OP [facility] REFERRAL CPAP RE-TITRATION STUDY     Patient Instructions   All adult screening ordered and done appropriate  for patient's age and gender and risk factors and complaints.  Medication reviewed and renewed where needed and appropriate.  Comply with medications.  Monitor blood pressures and record at home. Limit salt intake.    Return in about 1 year (around 6/9/2026), or if symptoms worsen or fail to improve.         [1]   Current Outpatient Medications   Medication Sig Dispense Refill    Testosterone 40.5 MG/2.5GM (1.62%) Transdermal Gel Place 1 packet onto the skin daily. 225 g 1    Omeprazole 40 MG Oral Capsule Delayed Release 1 capsule (open capsule) Orally Once a day for 90 days (Patient not taking: Reported on 6/9/2025)      Continuous Blood Gluc Sensor (DEXCOM G7 SENSOR) Does not apply Misc 1 each Every 10 days. Use as directed every 10 days (Patient not taking: Reported on 8/5/2024) 9 each 0    Blood Glucose Monitoring Suppl (ONETOUCH VERIO) w/Device Does not apply Kit 1 each As Directed. Use to monitor blood sugar levels 1x daily (Patient not taking: Reported on 8/5/2024) 1 kit 0    Glucose Blood (ONETOUCH VERIO) In Vitro Strip Use to check blood sugar 1x daily (Patient not taking: Reported on 8/5/2024) 100 strip 0    OneTouch Delica Lancets 33G Does not apply Misc Use to check blood sugar 1x daily (Patient not taking: Reported on 8/5/2024) 100 each 0    Testosterone (ANDROGEL) 40.5 MG/2.5GM (1.62%) Transdermal Gel Place 1 Application onto the skin daily. (Patient not taking: Reported on 6/9/2025) 90 each 0   [2] No Known Allergies  [3] No past medical history on file.  [4] No past surgical history on file.  [5] No family history on file.  [6]   Social History  Socioeconomic History    Marital status:    Tobacco Use    Smoking status: Some Days     Types: Cigars, Cigarettes    Smokeless tobacco: Never   Vaping Use    Vaping status: Never Used   Substance and Sexual Activity    Alcohol use: Yes    Drug use: Never    Sexual activity: Not Currently      today

## 2025-06-18 NOTE — TELEPHONE ENCOUNTER
I have placed an order on the chart for repeat urinalysis to reflex to urine culture.  Additionally I have placed an order on the chart for fit test in order to make sure that the patient does not have occult bleeding in his intestinal system.    Please let him know so that he can get these tests done and we again more information for potential treatment.

## 2025-06-25 ENCOUNTER — LAB ENCOUNTER (OUTPATIENT)
Dept: LAB | Age: 45
End: 2025-06-25
Attending: FAMILY MEDICINE
Payer: COMMERCIAL

## 2025-06-25 DIAGNOSIS — R82.90 ABNORMAL URINALYSIS: ICD-10-CM

## 2025-06-25 LAB
BILIRUB UR QL: NEGATIVE
CLARITY UR: CLEAR
COLOR UR: YELLOW
GLUCOSE UR-MCNC: NORMAL MG/DL
HGB UR QL STRIP.AUTO: NEGATIVE
KETONES UR-MCNC: NEGATIVE MG/DL
LEUKOCYTE ESTERASE UR QL STRIP.AUTO: NEGATIVE
NITRITE UR QL STRIP.AUTO: NEGATIVE
PH UR: 5.5 [PH] (ref 5–8)
PROT UR-MCNC: NEGATIVE MG/DL
SP GR UR STRIP: 1.02 (ref 1–1.03)
UROBILINOGEN UR STRIP-ACNC: NORMAL

## 2025-06-25 PROCEDURE — 81003 URINALYSIS AUTO W/O SCOPE: CPT

## 2025-07-03 ENCOUNTER — LAB ENCOUNTER (OUTPATIENT)
Dept: LAB | Facility: REFERENCE LAB | Age: 45
End: 2025-07-03
Attending: FAMILY MEDICINE
Payer: COMMERCIAL

## 2025-07-03 DIAGNOSIS — D64.9 MILD ANEMIA: ICD-10-CM

## 2025-07-03 LAB — HEMOCCULT STL QL: NEGATIVE

## 2025-07-13 ENCOUNTER — HOSPITAL ENCOUNTER (OUTPATIENT)
Age: 45
Discharge: HOME | End: 2025-07-13
Payer: COMMERCIAL

## 2025-07-13 VITALS
SYSTOLIC BLOOD PRESSURE: 105 MMHG | DIASTOLIC BLOOD PRESSURE: 67 MMHG | RESPIRATION RATE: 22 BRPM | TEMPERATURE: 99 F | HEART RATE: 52 BPM | OXYGEN SATURATION: 100 %

## 2025-07-13 DIAGNOSIS — G89.29 ACUTE EXACERBATION OF CHRONIC LOW BACK PAIN: Primary | ICD-10-CM

## 2025-07-13 DIAGNOSIS — M54.50 ACUTE EXACERBATION OF CHRONIC LOW BACK PAIN: Primary | ICD-10-CM

## 2025-07-13 RX ORDER — KETOROLAC TROMETHAMINE 30 MG/ML
30 INJECTION, SOLUTION INTRAMUSCULAR; INTRAVENOUS ONCE
Status: COMPLETED | OUTPATIENT
Start: 2025-07-13 | End: 2025-07-13

## 2025-07-13 RX ORDER — TIZANIDINE 2 MG/1
2 TABLET ORAL EVERY 6 HOURS PRN
Qty: 15 TABLET | Refills: 0 | Status: SHIPPED | OUTPATIENT
Start: 2025-07-13

## 2025-07-13 NOTE — ED INITIAL ASSESSMENT (HPI)
Pt presents with low back pain x one week. Pt did lower body exercises at gym and lifting heavy objects, exacerbated previous back pain. Hx of slipped disc.     Pt took Flexeril today at 9a with no relief. Pt took Aleve yesterday with minimal relief.

## 2025-07-13 NOTE — ED PROVIDER NOTES
Patient Seen in: [facility]        History  Chief Complaint   Patient presents with    Back Pain     Stated Complaint: back pain    Subjective:   HPI        Patient is a 44-year-old male who presents to the immediate care center with concern for right-sided low back pain.  This started several days ago after working out in the gym.  He has history of longstanding low back pain and states this feels similar.  He was lifting weights in the gym at time of onset.  Pain is isolated to the back, does not radiate.  Has had no bowel or bladder dysfunction; no saddle anesthesia.          Objective:     History reviewed. No pertinent past medical history.           Past Surgical History:   Procedure Laterality Date    Surgical bariatrics - internal  07/24/2024                Social History     Socioeconomic History    Marital status:    Tobacco Use    Smoking status: Some Days     Types: Cigars, Cigarettes    Smokeless tobacco: Never   Vaping Use    Vaping status: Never Used   Substance and Sexual Activity    Alcohol use: Yes    Drug use: Never    Sexual activity: Not Currently              Review of Systems   Constitutional: Negative.    Gastrointestinal:  Negative for abdominal pain.   Musculoskeletal:  Positive for back pain.   Skin:  Negative for rash.   Neurological:  Negative for weakness and numbness.       Positive for stated complaint: back pain  Other systems are as noted in HPI.  Constitutional and vital signs reviewed.      All other systems reviewed and negative except as noted above.                  Physical Exam    ED Triage Vitals [07/13/25 1455]   /67   Pulse 52   Resp 22   Temp 98.6 °F (37 °C)   Temp src Oral   SpO2 100 %   O2 Device None (Room air)       Current Vitals:   Vital Signs  BP: 105/67  Pulse: 52  Resp: 22  Temp: 98.6 °F (37 °C)  Temp src: Oral    Oxygen Therapy  SpO2: 100 %  O2 Device: None (Room air)            Physical Exam  Vitals and nursing note reviewed.    Constitutional:       General: He is not in acute distress.     Appearance: He is not ill-appearing.   Pulmonary:      Effort: Pulmonary effort is normal. No respiratory distress.   Musculoskeletal:        Back:    Skin:     General: Skin is warm and dry.   Neurological:      Mental Status: He is alert and oriented to person, place, and time.   Psychiatric:         Behavior: Behavior normal.                 ED Course  Labs Reviewed - No data to display  Medications   ketorolac (Toradol) 30 MG/ML injection 30 mg (30 mg Intramuscular Given 7/13/25 0616)                            MDM     Patient was advised that he should avoid oral NSAIDs because of his history of bariatric surgery.  He states that his surgeon has told him he could take ibuprofen if taken with omeprazole.  Patient was asked to review and affirmed this by his bariatric surgeon.    Prescriptions were provided for muscle relaxant.  He will use over-the-counter transdermal lidocaine and acetaminophen as directed.            Medical Decision Making  Differential diagnoses considered today include, but not exclusive to: Cauda equina syndrome, acute abdominal infection, pyelonephritis, nephrolithiasis, spinal bony injury, herniated disc, musculoskeletal strain.      Problems Addressed:  Acute exacerbation of chronic low back pain: self-limited or minor problem    Risk  OTC drugs.  Prescription drug management.        Disposition and Plan     Clinical Impression:  1. Acute exacerbation of chronic low back pain         Disposition:  Discharge  7/13/2025  3:16 pm    Follow-up:  Zion Hanson, DO  [address] [place] [id number] [phone]    Schedule an appointment as soon as possible for a visit in 1 week  As needed          Medications Prescribed:  Current Discharge Medication List        START taking these medications    Details   tiZANidine 2 MG Oral Tab Take 1 tablet (2 mg total) by mouth every 6 (six) hours as needed (muscle  cramping).  Qty: 15 tablet, Refills: 0                   Supplementary Documentation:

## 2025-08-20 ENCOUNTER — LAB ENCOUNTER (OUTPATIENT)
Dept: LAB | Age: 45
End: 2025-08-20
Attending: SPECIALIST

## 2025-08-20 DIAGNOSIS — E66.01 MORBID OBESITY (HCC): Primary | ICD-10-CM

## 2025-08-20 DIAGNOSIS — Z98.84 STATUS POST BARIATRIC SURGERY: ICD-10-CM

## 2025-08-20 DIAGNOSIS — K91.2: ICD-10-CM

## 2025-08-20 LAB — VIT D+METAB SERPL-MCNC: 38.7 NG/ML (ref 30–100)

## 2025-08-20 PROCEDURE — 84590 ASSAY OF VITAMIN A: CPT

## 2025-08-20 PROCEDURE — 36415 COLL VENOUS BLD VENIPUNCTURE: CPT

## 2025-08-20 PROCEDURE — 82306 VITAMIN D 25 HYDROXY: CPT

## 2025-08-25 ENCOUNTER — E-VISIT (OUTPATIENT)
Dept: TELEHEALTH | Age: 45
End: 2025-08-25

## 2025-08-25 DIAGNOSIS — R82.90 ABNORMAL URINE ODOR: Primary | ICD-10-CM

## 2025-08-25 DIAGNOSIS — R35.0 URINARY FREQUENCY: ICD-10-CM

## 2025-08-26 PROCEDURE — 99421 OL DIG E/M SVC 5-10 MIN: CPT | Performed by: PHYSICIAN ASSISTANT

## 2025-08-26 RX ORDER — TIRZEPATIDE 2.5 MG/.5ML
INJECTION, SOLUTION SUBCUTANEOUS
COMMUNITY

## 2025-08-26 RX ORDER — CHOLECALCIFEROL (VITAMIN D3) 1250 MCG
1 TABLET ORAL WEEKLY
COMMUNITY
Start: 2025-06-06

## 2025-08-26 RX ORDER — METFORMIN HYDROCHLORIDE 500 MG/1
TABLET, EXTENDED RELEASE ORAL
COMMUNITY

## 2025-08-29 LAB — VITAMIN A: 19.8 UG/DL
